# Patient Record
Sex: MALE | Race: OTHER | HISPANIC OR LATINO | URBAN - METROPOLITAN AREA
[De-identification: names, ages, dates, MRNs, and addresses within clinical notes are randomized per-mention and may not be internally consistent; named-entity substitution may affect disease eponyms.]

---

## 2020-03-29 ENCOUNTER — EMERGENCY (EMERGENCY)
Facility: HOSPITAL | Age: 40
LOS: 1 days | Discharge: HOME | End: 2020-03-29
Attending: EMERGENCY MEDICINE
Payer: MEDICARE

## 2020-03-29 VITALS
TEMPERATURE: 102 F | WEIGHT: 169.98 LBS | DIASTOLIC BLOOD PRESSURE: 88 MMHG | OXYGEN SATURATION: 96 % | HEART RATE: 123 BPM | RESPIRATION RATE: 24 BRPM | SYSTOLIC BLOOD PRESSURE: 143 MMHG | HEIGHT: 62 IN

## 2020-03-29 PROCEDURE — 99284 EMERGENCY DEPT VISIT MOD MDM: CPT

## 2020-03-29 PROCEDURE — 99282 EMERGENCY DEPT VISIT SF MDM: CPT

## 2020-03-29 RX ORDER — ACETAMINOPHEN 500 MG
975 TABLET ORAL ONCE
Refills: 0 | Status: COMPLETED | OUTPATIENT
Start: 2020-03-29 | End: 2020-03-29

## 2020-03-29 RX ADMIN — Medication 975 MILLIGRAM(S): at 20:09

## 2020-03-29 NOTE — ED ADULT TRIAGE NOTE - CHIEF COMPLAINT QUOTE
patient states that his partner was here on Wednesday and the department of health told him this morning that his partner was positive. patient states that he has been having these symptoms since Friday - states that he feels like he is running when he is trying to breath - has a really bad cough and fever and is hot to touch - body aches and pain

## 2020-03-29 NOTE — ED PROVIDER NOTE - PATIENT PORTAL LINK FT
You can access the FollowMyHealth Patient Portal offered by [facility] by registering at the following website: [email or web address]. By joining [employer]’s FollowMyHealth portal, you will also be able to view your health information using other applications (apps) compatible with our system.

## 2020-03-29 NOTE — ED PROVIDER NOTE - HEME LYMPH, MLM
Patient attended Phase III Cardiac Wellness  Paid for the month   No adenopathy or splenomegaly. No cervical or inguinal lymphadenopathy.

## 2020-03-29 NOTE — ED PROVIDER NOTE - OBJECTIVE STATEMENT
Patient is a 40 y/o male with no pmhx presenting to the ed with fever, body aches, cough associated sob since friday. pt states partner tested positive for covid 19 received the phone call this morning. pt non smoker, no resp or cardiac issues. pt denies cp, palpitations, calf pain or leg swelling, recent surgeries or travel, hx of blood clots, abd pain, nausesa, vomiting, diarrhea, dysuria

## 2020-03-29 NOTE — ED PROVIDER NOTE - CLINICAL SUMMARY MEDICAL DECISION MAKING FREE TEXT BOX
Supportive care and hydration, self isolate for 14 days, antipyretics as needed for fever, azithromycin to pharamacy, strict return precautions to the ed

## 2020-03-29 NOTE — ED PROVIDER NOTE - ATTENDING CONTRIBUTION TO CARE
38yo male no pMH p/w flu-like symptoms- cough, fever, body aches. Pt's partner COVID+. Denies shortness of breath/CP. VSS, PE unremarkable. Suspect COVID. Stable for dc home with instructions to quarantine.    I performed a history and physical exam of the patient and discussed their management with the advanced care provider. I reviewed the advanced care provider's note and agree with the documented findings and plan of care. My medical decision making and objective findings are found above.

## 2020-04-04 ENCOUNTER — INPATIENT (INPATIENT)
Facility: HOSPITAL | Age: 40
LOS: 2 days | Discharge: HOME | DRG: 177 | End: 2020-04-07
Attending: INTERNAL MEDICINE | Admitting: HOSPITALIST
Payer: MEDICARE

## 2020-04-04 VITALS
HEIGHT: 62 IN | OXYGEN SATURATION: 94 % | RESPIRATION RATE: 50 BRPM | SYSTOLIC BLOOD PRESSURE: 105 MMHG | WEIGHT: 169.98 LBS | HEART RATE: 115 BPM | TEMPERATURE: 100 F | DIASTOLIC BLOOD PRESSURE: 66 MMHG

## 2020-04-04 DIAGNOSIS — R09.02 HYPOXEMIA: ICD-10-CM

## 2020-04-04 LAB
ALBUMIN SERPL ELPH-MCNC: 4.1 G/DL — SIGNIFICANT CHANGE UP (ref 3.3–5.2)
ALP SERPL-CCNC: 96 U/L — SIGNIFICANT CHANGE UP (ref 40–120)
ALT FLD-CCNC: 73 U/L — HIGH
ANION GAP SERPL CALC-SCNC: 12 MMOL/L — SIGNIFICANT CHANGE UP (ref 5–17)
AST SERPL-CCNC: 44 U/L — HIGH
BASOPHILS # BLD AUTO: 0.02 K/UL — SIGNIFICANT CHANGE UP (ref 0–0.2)
BASOPHILS NFR BLD AUTO: 0.3 % — SIGNIFICANT CHANGE UP (ref 0–2)
BILIRUB SERPL-MCNC: 0.4 MG/DL — SIGNIFICANT CHANGE UP (ref 0.4–2)
BUN SERPL-MCNC: 8 MG/DL — SIGNIFICANT CHANGE UP (ref 8–20)
CALCIUM SERPL-MCNC: 8.7 MG/DL — SIGNIFICANT CHANGE UP (ref 8.6–10.2)
CHLORIDE SERPL-SCNC: 94 MMOL/L — LOW (ref 98–107)
CO2 SERPL-SCNC: 28 MMOL/L — SIGNIFICANT CHANGE UP (ref 22–29)
CREAT SERPL-MCNC: 0.92 MG/DL — SIGNIFICANT CHANGE UP (ref 0.5–1.3)
CRP SERPL-MCNC: 3.95 MG/DL — HIGH (ref 0–0.4)
EOSINOPHIL # BLD AUTO: 0.03 K/UL — SIGNIFICANT CHANGE UP (ref 0–0.5)
EOSINOPHIL NFR BLD AUTO: 0.4 % — SIGNIFICANT CHANGE UP (ref 0–6)
FERRITIN SERPL-MCNC: 1075 NG/ML — HIGH (ref 30–400)
GLUCOSE SERPL-MCNC: 102 MG/DL — HIGH (ref 70–99)
HCT VFR BLD CALC: 46.9 % — SIGNIFICANT CHANGE UP (ref 39–50)
HGB BLD-MCNC: 15.5 G/DL — SIGNIFICANT CHANGE UP (ref 13–17)
IMM GRANULOCYTES NFR BLD AUTO: 0.5 % — SIGNIFICANT CHANGE UP (ref 0–1.5)
LACTATE BLDV-MCNC: 0.8 MMOL/L — SIGNIFICANT CHANGE UP (ref 0.5–2)
LDH SERPL L TO P-CCNC: 281 U/L — HIGH (ref 98–192)
LYMPHOCYTES # BLD AUTO: 1.02 K/UL — SIGNIFICANT CHANGE UP (ref 1–3.3)
LYMPHOCYTES # BLD AUTO: 13.1 % — SIGNIFICANT CHANGE UP (ref 13–44)
MCHC RBC-ENTMCNC: 29.8 PG — SIGNIFICANT CHANGE UP (ref 27–34)
MCHC RBC-ENTMCNC: 33 GM/DL — SIGNIFICANT CHANGE UP (ref 32–36)
MCV RBC AUTO: 90 FL — SIGNIFICANT CHANGE UP (ref 80–100)
MONOCYTES # BLD AUTO: 0.65 K/UL — SIGNIFICANT CHANGE UP (ref 0–0.9)
MONOCYTES NFR BLD AUTO: 8.4 % — SIGNIFICANT CHANGE UP (ref 2–14)
NEUTROPHILS # BLD AUTO: 6 K/UL — SIGNIFICANT CHANGE UP (ref 1.8–7.4)
NEUTROPHILS NFR BLD AUTO: 77.3 % — HIGH (ref 43–77)
PLATELET # BLD AUTO: 242 K/UL — SIGNIFICANT CHANGE UP (ref 150–400)
POTASSIUM SERPL-MCNC: 4.5 MMOL/L — SIGNIFICANT CHANGE UP (ref 3.5–5.3)
POTASSIUM SERPL-SCNC: 4.5 MMOL/L — SIGNIFICANT CHANGE UP (ref 3.5–5.3)
PROCALCITONIN SERPL-MCNC: 0.08 NG/ML — SIGNIFICANT CHANGE UP (ref 0.02–0.1)
PROT SERPL-MCNC: 7.6 G/DL — SIGNIFICANT CHANGE UP (ref 6.6–8.7)
RBC # BLD: 5.21 M/UL — SIGNIFICANT CHANGE UP (ref 4.2–5.8)
RBC # FLD: 12.5 % — SIGNIFICANT CHANGE UP (ref 10.3–14.5)
SODIUM SERPL-SCNC: 134 MMOL/L — LOW (ref 135–145)
WBC # BLD: 7.76 K/UL — SIGNIFICANT CHANGE UP (ref 3.8–10.5)
WBC # FLD AUTO: 7.76 K/UL — SIGNIFICANT CHANGE UP (ref 3.8–10.5)

## 2020-04-04 PROCEDURE — 93010 ELECTROCARDIOGRAM REPORT: CPT

## 2020-04-04 PROCEDURE — 99223 1ST HOSP IP/OBS HIGH 75: CPT

## 2020-04-04 PROCEDURE — 71045 X-RAY EXAM CHEST 1 VIEW: CPT | Mod: 26

## 2020-04-04 PROCEDURE — 99285 EMERGENCY DEPT VISIT HI MDM: CPT

## 2020-04-04 RX ORDER — SODIUM CHLORIDE 9 MG/ML
1000 INJECTION INTRAMUSCULAR; INTRAVENOUS; SUBCUTANEOUS ONCE
Refills: 0 | Status: COMPLETED | OUTPATIENT
Start: 2020-04-04 | End: 2020-04-04

## 2020-04-04 RX ORDER — HEPARIN SODIUM 5000 [USP'U]/ML
5000 INJECTION INTRAVENOUS; SUBCUTANEOUS EVERY 8 HOURS
Refills: 0 | Status: DISCONTINUED | OUTPATIENT
Start: 2020-04-04 | End: 2020-04-06

## 2020-04-04 RX ORDER — ACETAMINOPHEN 500 MG
650 TABLET ORAL ONCE
Refills: 0 | Status: COMPLETED | OUTPATIENT
Start: 2020-04-04 | End: 2020-04-04

## 2020-04-04 RX ORDER — AZITHROMYCIN 500 MG/1
500 TABLET, FILM COATED ORAL ONCE
Refills: 0 | Status: COMPLETED | OUTPATIENT
Start: 2020-04-04 | End: 2020-04-04

## 2020-04-04 RX ORDER — ACETAMINOPHEN 500 MG
650 TABLET ORAL EVERY 4 HOURS
Refills: 0 | Status: DISCONTINUED | OUTPATIENT
Start: 2020-04-04 | End: 2020-04-07

## 2020-04-04 RX ORDER — ASCORBIC ACID 60 MG
1000 TABLET,CHEWABLE ORAL DAILY
Refills: 0 | Status: DISCONTINUED | OUTPATIENT
Start: 2020-04-04 | End: 2020-04-07

## 2020-04-04 RX ORDER — CEFTRIAXONE 500 MG/1
1000 INJECTION, POWDER, FOR SOLUTION INTRAMUSCULAR; INTRAVENOUS ONCE
Refills: 0 | Status: COMPLETED | OUTPATIENT
Start: 2020-04-04 | End: 2020-04-04

## 2020-04-04 RX ADMIN — HEPARIN SODIUM 5000 UNIT(S): 5000 INJECTION INTRAVENOUS; SUBCUTANEOUS at 23:55

## 2020-04-04 RX ADMIN — Medication 650 MILLIGRAM(S): at 16:58

## 2020-04-04 RX ADMIN — SODIUM CHLORIDE 1000 MILLILITER(S): 9 INJECTION INTRAMUSCULAR; INTRAVENOUS; SUBCUTANEOUS at 17:54

## 2020-04-04 RX ADMIN — CEFTRIAXONE 1000 MILLIGRAM(S): 500 INJECTION, POWDER, FOR SOLUTION INTRAMUSCULAR; INTRAVENOUS at 17:54

## 2020-04-04 RX ADMIN — SODIUM CHLORIDE 1000 MILLILITER(S): 9 INJECTION INTRAMUSCULAR; INTRAVENOUS; SUBCUTANEOUS at 16:58

## 2020-04-04 RX ADMIN — AZITHROMYCIN 500 MILLIGRAM(S): 500 TABLET, FILM COATED ORAL at 19:39

## 2020-04-04 RX ADMIN — AZITHROMYCIN 255 MILLIGRAM(S): 500 TABLET, FILM COATED ORAL at 17:54

## 2020-04-04 RX ADMIN — CEFTRIAXONE 100 MILLIGRAM(S): 500 INJECTION, POWDER, FOR SOLUTION INTRAMUSCULAR; INTRAVENOUS at 16:58

## 2020-04-04 NOTE — H&P ADULT - HISTORY OF PRESENT ILLNESS
40 y/o male with no significant PMH came to the ED complaining of difficulty breathing with minimal exertion. Patient said he has been having persistent fever, body aches, for over a week. He came to the ED was sent home to self isolate. He called his PCP because he was not getting better who recommended for him to go to [place] for CoVID-19 test. He was tested; got a call yesterday that he tested positive. He mentioned associated cough that worsened with inspiration. He was taken care of his friend 2 weeks ago who was also tested positive for coronavirus. He has no recent travel, nausea, vomiting, abdominal pain, chest pain, palpitation, HA.

## 2020-04-04 NOTE — ED ADULT NURSE NOTE - OBJECTIVE STATEMENT
Pt told he was +covid today after being tested Thursday. Pt having cough, fever, sob. Pt told by [facility] to come to ED for eval. Pt denies any PMHx.

## 2020-04-04 NOTE — H&P ADULT - ASSESSMENT
38 y/o male with no significant PMH came to the ED complaining of difficulty breathing with minimal exertion. Patient said he has been having persistent fever, body aches, for over a week. He came to the ED was sent home to self isolate. He called his PCP because he was not getting better who recommended for him to go to [place] for CoVID-19 test. He was tested; got a call yesterday that he tested positive. He mentioned associated cough that worsened with inspiration. He was taken care of his friend 2 weeks ago who was also tested positive for coronavirus.  CXR: LEFT perihilar/midlung and peripheral bibasilar consolidation. The appearance raises the possibility of atypical viral/COVID pneumonia.     Hypoxia due to atypical CoVID-19 pneumonia   Admit to medical floor with    Inflammatory makers elevated   CoVID-19 positive (test was done in outside facility)   Tylenol PRN   Patient stable, will hold off on antibiotic for now   Vitamin C daily   Isolation protocol   Prone positioning every hour for at least 15 minutes as tolerated     Supportive   DVT prophylaxis: heparin sc   Diet: regular

## 2020-04-04 NOTE — ED PROVIDER NOTE - PHYSICAL EXAMINATION
General: well appearing, interactive, well nourished, NAD  HEENT: pupils equal and reactive, normal external ears bilaterally   Cardiac: RRR, no MRG appreciated  Resp: tachypneic, diminished breath sounds, symmetric chest wall rise  Abd: soft, nontender, nondistended,   : no CVA tenderness  Neuro: Moving all extremities  Skin:  normal color for race

## 2020-04-04 NOTE — ED PROVIDER NOTE - ATTENDING CONTRIBUTION TO CARE
The patient seen and examined    Pneumonia  COVID-19    I, James Steven, performed the initial face to face bedside interview with this patient regarding history of present illness, review of symptoms and relevant past medical, social and family history.  I completed an independent physical examination.  I was the initial provider who evaluated this patient. I have signed out the follow up of any pending tests (i.e. labs, radiological studies) to the resident.  I have communicated the patient’s plan of care and disposition with the resident.

## 2020-04-04 NOTE — ED PROVIDER NOTE - OBJECTIVE STATEMENT
Pt is a 40 y/o M presents c/o shortness of breath.  States he was seen at [facility] for flu like symptoms and sent home to self isolate.  He began to feel shortness of breath on wednesday and called his PMD and was told to go get tested, he received the results today and was experiencing worsening shortness of breath, told over the phone to go to the ED.  Pt denies fever, headache, diarrhea.

## 2020-04-04 NOTE — ED PROVIDER NOTE - CARE PLAN
Principal Discharge DX:	Pneumonia  Secondary Diagnosis:	Hypoxemia  Secondary Diagnosis:	Coronavirus infection

## 2020-04-04 NOTE — ED ADULT TRIAGE NOTE - CHIEF COMPLAINT QUOTE
pt reports he was tested thursday for COVID and called back today for +results, while on phone he was told by [place] to go to nearest hospital for further eval for his trouble breathing. pt presents with dry cough, SOB, tachypnea.

## 2020-04-04 NOTE — ED ADULT NURSE NOTE - CHIEF COMPLAINT QUOTE
pt reports he was tested thursday for COVID and called back today for +results, while on phone he was told by [place] to go to nearest hospital for further eval for his trouble breathing. pt presents with dry cough, SOB, tachypnea.

## 2020-04-05 LAB
ALBUMIN SERPL ELPH-MCNC: 3.7 G/DL — SIGNIFICANT CHANGE UP (ref 3.3–5.2)
ALP SERPL-CCNC: 98 U/L — SIGNIFICANT CHANGE UP (ref 40–120)
ALT FLD-CCNC: 86 U/L — HIGH
ANION GAP SERPL CALC-SCNC: 14 MMOL/L — SIGNIFICANT CHANGE UP (ref 5–17)
AST SERPL-CCNC: 55 U/L — HIGH
BASOPHILS # BLD AUTO: 0.02 K/UL — SIGNIFICANT CHANGE UP (ref 0–0.2)
BASOPHILS NFR BLD AUTO: 0.3 % — SIGNIFICANT CHANGE UP (ref 0–2)
BILIRUB SERPL-MCNC: 0.4 MG/DL — SIGNIFICANT CHANGE UP (ref 0.4–2)
BUN SERPL-MCNC: 8 MG/DL — SIGNIFICANT CHANGE UP (ref 8–20)
CALCIUM SERPL-MCNC: 8.8 MG/DL — SIGNIFICANT CHANGE UP (ref 8.6–10.2)
CHLORIDE SERPL-SCNC: 98 MMOL/L — SIGNIFICANT CHANGE UP (ref 98–107)
CO2 SERPL-SCNC: 25 MMOL/L — SIGNIFICANT CHANGE UP (ref 22–29)
CREAT SERPL-MCNC: 0.67 MG/DL — SIGNIFICANT CHANGE UP (ref 0.5–1.3)
EOSINOPHIL # BLD AUTO: 0.03 K/UL — SIGNIFICANT CHANGE UP (ref 0–0.5)
EOSINOPHIL NFR BLD AUTO: 0.4 % — SIGNIFICANT CHANGE UP (ref 0–6)
GLUCOSE SERPL-MCNC: 92 MG/DL — SIGNIFICANT CHANGE UP (ref 70–99)
HCT VFR BLD CALC: 47.5 % — SIGNIFICANT CHANGE UP (ref 39–50)
HGB BLD-MCNC: 15.4 G/DL — SIGNIFICANT CHANGE UP (ref 13–17)
IMM GRANULOCYTES NFR BLD AUTO: 0.6 % — SIGNIFICANT CHANGE UP (ref 0–1.5)
LYMPHOCYTES # BLD AUTO: 1.17 K/UL — SIGNIFICANT CHANGE UP (ref 1–3.3)
LYMPHOCYTES # BLD AUTO: 14.9 % — SIGNIFICANT CHANGE UP (ref 13–44)
MCHC RBC-ENTMCNC: 30.1 PG — SIGNIFICANT CHANGE UP (ref 27–34)
MCHC RBC-ENTMCNC: 32.4 GM/DL — SIGNIFICANT CHANGE UP (ref 32–36)
MCV RBC AUTO: 92.8 FL — SIGNIFICANT CHANGE UP (ref 80–100)
MONOCYTES # BLD AUTO: 0.67 K/UL — SIGNIFICANT CHANGE UP (ref 0–0.9)
MONOCYTES NFR BLD AUTO: 8.5 % — SIGNIFICANT CHANGE UP (ref 2–14)
NEUTROPHILS # BLD AUTO: 5.93 K/UL — SIGNIFICANT CHANGE UP (ref 1.8–7.4)
NEUTROPHILS NFR BLD AUTO: 75.3 % — SIGNIFICANT CHANGE UP (ref 43–77)
PLATELET # BLD AUTO: 242 K/UL — SIGNIFICANT CHANGE UP (ref 150–400)
POTASSIUM SERPL-MCNC: 4.3 MMOL/L — SIGNIFICANT CHANGE UP (ref 3.5–5.3)
POTASSIUM SERPL-SCNC: 4.3 MMOL/L — SIGNIFICANT CHANGE UP (ref 3.5–5.3)
PROT SERPL-MCNC: 7.3 G/DL — SIGNIFICANT CHANGE UP (ref 6.6–8.7)
RBC # BLD: 5.12 M/UL — SIGNIFICANT CHANGE UP (ref 4.2–5.8)
RBC # FLD: 12.7 % — SIGNIFICANT CHANGE UP (ref 10.3–14.5)
SODIUM SERPL-SCNC: 137 MMOL/L — SIGNIFICANT CHANGE UP (ref 135–145)
WBC # BLD: 7.87 K/UL — SIGNIFICANT CHANGE UP (ref 3.8–10.5)
WBC # FLD AUTO: 7.87 K/UL — SIGNIFICANT CHANGE UP (ref 3.8–10.5)

## 2020-04-05 PROCEDURE — 99232 SBSQ HOSP IP/OBS MODERATE 35: CPT

## 2020-04-05 PROCEDURE — 93010 ELECTROCARDIOGRAM REPORT: CPT

## 2020-04-05 RX ORDER — ALBUTEROL 90 UG/1
2 AEROSOL, METERED ORAL EVERY 6 HOURS
Refills: 0 | Status: DISCONTINUED | OUTPATIENT
Start: 2020-04-05 | End: 2020-04-07

## 2020-04-05 RX ORDER — HYDROXYCHLOROQUINE SULFATE 200 MG
200 TABLET ORAL EVERY 12 HOURS
Refills: 0 | Status: DISCONTINUED | OUTPATIENT
Start: 2020-04-06 | End: 2020-04-07

## 2020-04-05 RX ORDER — HYDROXYCHLOROQUINE SULFATE 200 MG
TABLET ORAL
Refills: 0 | Status: DISCONTINUED | OUTPATIENT
Start: 2020-04-05 | End: 2020-04-07

## 2020-04-05 RX ORDER — AMITRIPTYLINE HCL 25 MG
50 TABLET ORAL DAILY
Refills: 0 | Status: DISCONTINUED | OUTPATIENT
Start: 2020-04-05 | End: 2020-04-07

## 2020-04-05 RX ORDER — ALPRAZOLAM 0.25 MG
0.5 TABLET ORAL
Refills: 0 | Status: DISCONTINUED | OUTPATIENT
Start: 2020-04-05 | End: 2020-04-07

## 2020-04-05 RX ORDER — HYDROXYCHLOROQUINE SULFATE 200 MG
400 TABLET ORAL EVERY 12 HOURS
Refills: 0 | Status: COMPLETED | OUTPATIENT
Start: 2020-04-05 | End: 2020-04-05

## 2020-04-05 RX ADMIN — Medication 1000 MILLIGRAM(S): at 11:57

## 2020-04-05 RX ADMIN — Medication 0.5 MILLIGRAM(S): at 18:38

## 2020-04-05 RX ADMIN — Medication 400 MILLIGRAM(S): at 11:57

## 2020-04-05 RX ADMIN — HEPARIN SODIUM 5000 UNIT(S): 5000 INJECTION INTRAVENOUS; SUBCUTANEOUS at 04:32

## 2020-04-05 RX ADMIN — Medication 50 MILLIGRAM(S): at 18:38

## 2020-04-05 RX ADMIN — HEPARIN SODIUM 5000 UNIT(S): 5000 INJECTION INTRAVENOUS; SUBCUTANEOUS at 15:00

## 2020-04-05 RX ADMIN — Medication 400 MILLIGRAM(S): at 22:53

## 2020-04-05 RX ADMIN — HEPARIN SODIUM 5000 UNIT(S): 5000 INJECTION INTRAVENOUS; SUBCUTANEOUS at 22:54

## 2020-04-05 NOTE — PROGRESS NOTE ADULT - ASSESSMENT
38 y/o male with no significant PMH came to the ED complaining of difficulty breathing with minimal exertion. Patient said he has been having persistent fever, body aches, for over a week. He came to the ED was sent home to self isolate. He called his PCP because he was not getting better who recommended for him to go to [place] for CoVID-19 test. He was tested; got a call yesterday that he tested positive. He mentioned associated cough that worsened with inspiration. He was taken care of his friend 2 weeks ago who was also tested positive for coronavirus.  CXR: LEFT perihilar/midlung and peripheral bibasilar consolidation. The appearance raises the possibility of atypical viral/COVID pneumonia.     > acute Hypoxic resp failure / likely due to atypical viral CoVID-19 pneumonia /CoVID-19 positive (test was done in outside facility)   Inflammatory makers elevated   -will start on HCQ   -Vitamin C daily and thiamine   -Isolation protocol   -Prone positioning every hour for at least 15 minutes as tolerated     >DVT prophylaxis: heparin sc     >dispo: possible dc home in 24-48 hours if medically stable and able to sat >90 on RA

## 2020-04-05 NOTE — ED ADULT NURSE REASSESSMENT NOTE - NS ED NURSE REASSESS COMMENT FT1
Pt reieved @ 0800. pt a&ox4 with complaints of sob. pt satting well on 3L nasal canula, ambulated to the bathroom, respirations equal and unlabored, isolation precautions maintained, montior/  on, poc reviewed with pt.

## 2020-04-05 NOTE — PROGRESS NOTE ADULT - SUBJECTIVE AND OBJECTIVE BOX
cc: sob , covid pna       interval hx: patient seen and eval, had fever 101.5 overnight. ON NC o2 4 L , sats 91-96 , mildly tachypneic .no n/v/diarrhea    Vital Signs Last 24 Hrs  T(C): 37.6 (05 Apr 2020 08:16), Max: 38.6 (04 Apr 2020 18:39)  T(F): 99.7 (05 Apr 2020 08:16), Max: 101.5 (04 Apr 2020 18:39)  HR: 102 (05 Apr 2020 08:16) (95 - 115)  BP: 115/76 (05 Apr 2020 08:16) (100/55 - 115/76)  BP(mean): --  RR: 21 (05 Apr 2020 08:16) (21 - 50)  SpO2: 96% (05 Apr 2020 08:16) (91% - 96%)    Physical Examination:   General:  mild sob, able to speak in full sentences   Head:     NC/AT, EOMI, oral mucosa moist  Neck:     supple, trachea midline  Lungs:     occasional rhonchi at bases b/l   CVS:     S1S2, RRR, no m/g/r  Abd:     +BS, s/nt/nd, no organomegaly  Ext:   no dedema noted   neuro: nonfocal                            15.5   7.76  )-----------( 242      ( 04 Apr 2020 17:13 )             46.9   04-04    134<L>  |  94<L>  |  8.0  ----------------------------<  102<H>  4.5   |  28.0  |  0.92    Ca    8.7      04 Apr 2020 17:13    TPro  7.6  /  Alb  4.1  /  TBili  0.4  /  DBili  x   /  AST  44<H>  /  ALT  73<H>  /  AlkPhos  96  04-04    < from: Xray Chest 1 View-PORTABLE IMMEDIATE (04.04.20 @ 15:54) >  IMPRESSION:    1.  LEFT perihilar/midlung and peripheral bibasilar consolidation. The appearance raises the possibility of atypical viral/COVID pneumonia    < end of copied text >

## 2020-04-06 PROCEDURE — 99232 SBSQ HOSP IP/OBS MODERATE 35: CPT

## 2020-04-06 PROCEDURE — 93010 ELECTROCARDIOGRAM REPORT: CPT

## 2020-04-06 RX ORDER — CHOLECALCIFEROL (VITAMIN D3) 125 MCG
1000 CAPSULE ORAL DAILY
Refills: 0 | Status: DISCONTINUED | OUTPATIENT
Start: 2020-04-06 | End: 2020-04-07

## 2020-04-06 RX ORDER — ENOXAPARIN SODIUM 100 MG/ML
40 INJECTION SUBCUTANEOUS DAILY
Refills: 0 | Status: DISCONTINUED | OUTPATIENT
Start: 2020-04-06 | End: 2020-04-07

## 2020-04-06 RX ORDER — THIAMINE MONONITRATE (VIT B1) 100 MG
200 TABLET ORAL DAILY
Refills: 0 | Status: DISCONTINUED | OUTPATIENT
Start: 2020-04-06 | End: 2020-04-07

## 2020-04-06 RX ADMIN — HEPARIN SODIUM 5000 UNIT(S): 5000 INJECTION INTRAVENOUS; SUBCUTANEOUS at 13:55

## 2020-04-06 RX ADMIN — Medication 100 MILLIGRAM(S): at 22:26

## 2020-04-06 RX ADMIN — Medication 200 MILLIGRAM(S): at 22:27

## 2020-04-06 RX ADMIN — Medication 200 MILLIGRAM(S): at 11:25

## 2020-04-06 RX ADMIN — Medication 1000 MILLIGRAM(S): at 11:25

## 2020-04-06 RX ADMIN — Medication 0.5 MILLIGRAM(S): at 22:26

## 2020-04-06 RX ADMIN — Medication 50 MILLIGRAM(S): at 22:26

## 2020-04-06 RX ADMIN — HEPARIN SODIUM 5000 UNIT(S): 5000 INJECTION INTRAVENOUS; SUBCUTANEOUS at 05:30

## 2020-04-06 NOTE — PROGRESS NOTE ADULT - ASSESSMENT
38 y/o male with no significant PMH came to the ED complaining of difficulty breathing with minimal exertion, known covid19 pos from community testing, admitted for resp failure.    > acute Hypoxic resp failure sec to CoVID-19 pneumonia,. stable - pos pcr confirmed at another facility prior to admit.  -HCQ, monitor qtc, ekg 4/8 in am   -vitamin supplementation, supportive care  -Prone positioning as long as tolerated  -suplemental o2, titrate to RA as tolerated, not baseline dependent    >DVT prophylaxis: lovenox    >dispo: pending improvement/resolution of resp failure. if maintaining well on 2-3LNC and inflammatory markers improving will try to dc in am 40 y/o male with no significant PMH came to the ED complaining of difficulty breathing with minimal exertion, known covid19 pos from community testing, admitted for resp failure.    > acute Hypoxic resp failure sec to CoVID-19 pneumonia,. stable - pos pcr confirmed at another facility prior to admit.  -HCQ, monitor qtc, ekg 4/8 in am   -vitamin supplementation, supportive care  -Prone positioning as long as tolerated  -suplemental o2, titrate to RA as tolerated, not baseline dependent - desat to 87% on RA during ambulation, improves to 97% on 2LNC during ambulation. given the presence of consistent acute respiratory failure w/ hypoxia secondary to confirmed COVID19 infection which is otherwise under control and stable, the patient will need supplemental o2 therapy via NC in order to leave the hospital. due to current circumstances surrounding this pandemic, immediate discharge of this patient in order to increase hospital capacity and mitigate risk of additional spread is paramount, will request home o2 in order to expedite patient discharge to continue quarantine at home or another facility.     >DVT prophylaxis: lovenox    >dispo: pending improvement/resolution of resp failure. if maintaining well on 2-3LNC and inflammatory markers improving will try to dc in am - needs home o2

## 2020-04-06 NOTE — PROGRESS NOTE ADULT - SUBJECTIVE AND OBJECTIVE BOX
CC: covid    Patient seen and examined at the bedside. No acute overnight events. - yesterday. Complaining of - today. Denies fever/chills, headache, lightheadedness, dizziness, chest pain, palpitations, shortness of breath, cough, abd pain, nausea/vomiting/diarrhea, muscle pain.      =========================================================================================  T(C): 37.1 (04-06-20 @ 13:53), Max: 38.2 (04-05-20 @ 22:28)  HR: 102 (04-06-20 @ 13:53) (88 - 102)  BP: 104/73 (04-06-20 @ 13:53) (104/73 - 128/78)  RR: 22 (04-06-20 @ 13:53) (22 - 25)  SpO2: 97% (04-06-20 @ 13:53) (97% - 99%)    PHYSICAL EXAM.    GEN - appears age appropriate. well nourished. pleasant. no distress.   HEENT - NCAT, EOMI, ТАТЬЯНА  RESP - CTA BL, no wheeze/stridor/rhonchi/crackles. not on supplemental O2. able to speak in full sentences without distress.   CARDIO - NS1S2, RRR.   ABD - Soft/Non tender/Non distended. Normal BS x4 quadrants. no guarding/rebound tenderness.  Ext - No ALLY.  MSK - BL 5/5 strength on upper and lower extremities.   Neuro - AAOx3. no gross neuro deficits noted  Psych - normal affect  Skin - c/d/i. no rashes/lesions      I&O's Summary    Daily     Daily     =========================================================================================  LABS.    04-05    137  |  98  |  8.0  ----------------------------<  92  4.3   |  25.0  |  0.67    Ca    8.8      05 Apr 2020 13:52    TPro  7.3  /  Alb  3.7  /  TBili  0.4  /  DBili  x   /  AST  55<H>  /  ALT  86<H>  /  AlkPhos  98  04-05                          15.4   7.87  )-----------( 242      ( 05 Apr 2020 13:52 )             47.5     LIVER FUNCTIONS - ( 05 Apr 2020 13:52 )  Alb: 3.7 g/dL / Pro: 7.3 g/dL / ALK PHOS: 98 U/L / ALT: 86 U/L / AST: 55 U/L / GGT: x                       =========================================================================================  IMAGING.     =========================================================================================    DIET.    Diet, Regular (04-04-20 @ 21:37)      =========================================================================================    HOME MEDS.    Home Medications:      =========================================================================================    HOSPITAL MEDS.    MEDICATIONS  (STANDING):  amitriptyline 50 milliGRAM(s) Oral daily  ascorbic acid 1000 milliGRAM(s) Oral daily  heparin  Injectable 5000 Unit(s) SubCutaneous every 8 hours  hydroxychloroquine   Oral   hydroxychloroquine 200 milliGRAM(s) Oral every 12 hours    MEDICATIONS  (PRN):  acetaminophen   Tablet .. 650 milliGRAM(s) Oral every 4 hours PRN Temp greater or equal to 38.5C (101.3F)  ALBUTerol    90 MICROgram(s) HFA Inhaler 2 Puff(s) Inhalation every 6 hours PRN Shortness of Breath and/or Wheezing  ALPRAZolam 0.5 milliGRAM(s) Oral two times a day PRN anxiwty CC: covid    Patient seen and examined at the bedside. No acute overnight events. no events yesterday. Complaining of sob, occ cough today. Denies fever/chills, headache, lightheadedness, dizziness, chest pain, palpitations, abd pain, nausea/vomiting/diarrhea, muscle pain.      =========================================================================================  T(C): 37.1 (04-06-20 @ 13:53), Max: 38.2 (04-05-20 @ 22:28)  HR: 102 (04-06-20 @ 13:53) (88 - 102)  BP: 104/73 (04-06-20 @ 13:53) (104/73 - 128/78)  RR: 22 (04-06-20 @ 13:53) (22 - 25)  SpO2: 97% (04-06-20 @ 13:53) (97% - 99%)    PHYSICAL EXAM.    GEN - appears age appropriate. well nourished. pleasant. no distress.   HEENT - NCAT, EOMI, ТАТЬЯНА  RESP - CTA BL, no wheeze/stridor/rhonchi/crackles. occ coughing fit w/ deep inspiration. not on supplemental O2. able to speak in full sentences without distress. no accessory muscle use.   CARDIO - NS1S2, RRR.   ABD - Soft/Non tender/Non distended. Normal BS x4 quadrants. no guarding/rebound tenderness.  Ext - No ALLY.  MSK - BL 5/5 strength on upper and lower extremities.   Neuro - AAOx3. no gross neuro deficits noted  Psych - normal affect  Skin - c/d/i. no rashes/lesions      I&O's Summary    Daily     Daily     =========================================================================================  LABS.    04-05    137  |  98  |  8.0  ----------------------------<  92  4.3   |  25.0  |  0.67    Ca    8.8      05 Apr 2020 13:52    TPro  7.3  /  Alb  3.7  /  TBili  0.4  /  DBili  x   /  AST  55<H>  /  ALT  86<H>  /  AlkPhos  98  04-05                          15.4   7.87  )-----------( 242      ( 05 Apr 2020 13:52 )             47.5     LIVER FUNCTIONS - ( 05 Apr 2020 13:52 )  Alb: 3.7 g/dL / Pro: 7.3 g/dL / ALK PHOS: 98 U/L / ALT: 86 U/L / AST: 55 U/L / GGT: x                       =========================================================================================  IMAGING.     =========================================================================================    DIET.    Diet, Regular (04-04-20 @ 21:37)      =========================================================================================    HOME MEDS.    Home Medications:      =========================================================================================    HOSPITAL MEDS.    MEDICATIONS  (STANDING):  amitriptyline 50 milliGRAM(s) Oral daily  ascorbic acid 1000 milliGRAM(s) Oral daily  heparin  Injectable 5000 Unit(s) SubCutaneous every 8 hours  hydroxychloroquine   Oral   hydroxychloroquine 200 milliGRAM(s) Oral every 12 hours    MEDICATIONS  (PRN):  acetaminophen   Tablet .. 650 milliGRAM(s) Oral every 4 hours PRN Temp greater or equal to 38.5C (101.3F)  ALBUTerol    90 MICROgram(s) HFA Inhaler 2 Puff(s) Inhalation every 6 hours PRN Shortness of Breath and/or Wheezing  ALPRAZolam 0.5 milliGRAM(s) Oral two times a day PRN anxiwty

## 2020-04-07 ENCOUNTER — TRANSCRIPTION ENCOUNTER (OUTPATIENT)
Age: 40
End: 2020-04-07

## 2020-04-07 VITALS
TEMPERATURE: 98 F | RESPIRATION RATE: 18 BRPM | OXYGEN SATURATION: 96 % | DIASTOLIC BLOOD PRESSURE: 67 MMHG | HEART RATE: 86 BPM | SYSTOLIC BLOOD PRESSURE: 102 MMHG

## 2020-04-07 LAB
ALBUMIN SERPL ELPH-MCNC: 3.5 G/DL — SIGNIFICANT CHANGE UP (ref 3.3–5.2)
ALP SERPL-CCNC: 92 U/L — SIGNIFICANT CHANGE UP (ref 40–120)
ALT FLD-CCNC: 60 U/L — HIGH
ANION GAP SERPL CALC-SCNC: 12 MMOL/L — SIGNIFICANT CHANGE UP (ref 5–17)
AST SERPL-CCNC: 30 U/L — SIGNIFICANT CHANGE UP
BASOPHILS # BLD AUTO: 0.02 K/UL — SIGNIFICANT CHANGE UP (ref 0–0.2)
BASOPHILS NFR BLD AUTO: 0.3 % — SIGNIFICANT CHANGE UP (ref 0–2)
BILIRUB SERPL-MCNC: 0.5 MG/DL — SIGNIFICANT CHANGE UP (ref 0.4–2)
BUN SERPL-MCNC: 11 MG/DL — SIGNIFICANT CHANGE UP (ref 8–20)
CALCIUM SERPL-MCNC: 8.5 MG/DL — LOW (ref 8.6–10.2)
CHLORIDE SERPL-SCNC: 96 MMOL/L — LOW (ref 98–107)
CO2 SERPL-SCNC: 27 MMOL/L — SIGNIFICANT CHANGE UP (ref 22–29)
CREAT SERPL-MCNC: 0.82 MG/DL — SIGNIFICANT CHANGE UP (ref 0.5–1.3)
CRP SERPL-MCNC: 5.19 MG/DL — HIGH (ref 0–0.4)
D DIMER BLD IA.RAPID-MCNC: 164 NG/ML DDU — SIGNIFICANT CHANGE UP
EOSINOPHIL # BLD AUTO: 0.23 K/UL — SIGNIFICANT CHANGE UP (ref 0–0.5)
EOSINOPHIL NFR BLD AUTO: 3.3 % — SIGNIFICANT CHANGE UP (ref 0–6)
FERRITIN SERPL-MCNC: 1116 NG/ML — HIGH (ref 30–400)
FIBRINOGEN PPP-MCNC: 954 MG/DL — CRITICAL HIGH (ref 300–520)
GLUCOSE SERPL-MCNC: 88 MG/DL — SIGNIFICANT CHANGE UP (ref 70–99)
HCT VFR BLD CALC: 44.9 % — SIGNIFICANT CHANGE UP (ref 39–50)
HGB BLD-MCNC: 14.6 G/DL — SIGNIFICANT CHANGE UP (ref 13–17)
IMM GRANULOCYTES NFR BLD AUTO: 0.9 % — SIGNIFICANT CHANGE UP (ref 0–1.5)
LDH SERPL L TO P-CCNC: 208 U/L — HIGH (ref 98–192)
LYMPHOCYTES # BLD AUTO: 1.2 K/UL — SIGNIFICANT CHANGE UP (ref 1–3.3)
LYMPHOCYTES # BLD AUTO: 17 % — SIGNIFICANT CHANGE UP (ref 13–44)
MCHC RBC-ENTMCNC: 29.5 PG — SIGNIFICANT CHANGE UP (ref 27–34)
MCHC RBC-ENTMCNC: 32.5 GM/DL — SIGNIFICANT CHANGE UP (ref 32–36)
MCV RBC AUTO: 90.7 FL — SIGNIFICANT CHANGE UP (ref 80–100)
MONOCYTES # BLD AUTO: 0.71 K/UL — SIGNIFICANT CHANGE UP (ref 0–0.9)
MONOCYTES NFR BLD AUTO: 10.1 % — SIGNIFICANT CHANGE UP (ref 2–14)
NEUTROPHILS # BLD AUTO: 4.83 K/UL — SIGNIFICANT CHANGE UP (ref 1.8–7.4)
NEUTROPHILS NFR BLD AUTO: 68.4 % — SIGNIFICANT CHANGE UP (ref 43–77)
PLATELET # BLD AUTO: 314 K/UL — SIGNIFICANT CHANGE UP (ref 150–400)
POTASSIUM SERPL-MCNC: 3.9 MMOL/L — SIGNIFICANT CHANGE UP (ref 3.5–5.3)
POTASSIUM SERPL-SCNC: 3.9 MMOL/L — SIGNIFICANT CHANGE UP (ref 3.5–5.3)
PROCALCITONIN SERPL-MCNC: 0.07 NG/ML — SIGNIFICANT CHANGE UP (ref 0.02–0.1)
PROT SERPL-MCNC: 7.1 G/DL — SIGNIFICANT CHANGE UP (ref 6.6–8.7)
RBC # BLD: 4.95 M/UL — SIGNIFICANT CHANGE UP (ref 4.2–5.8)
RBC # FLD: 12.7 % — SIGNIFICANT CHANGE UP (ref 10.3–14.5)
SODIUM SERPL-SCNC: 135 MMOL/L — SIGNIFICANT CHANGE UP (ref 135–145)
TRIGL SERPL-MCNC: 128 MG/DL — SIGNIFICANT CHANGE UP (ref 10–200)
TROPONIN T SERPL-MCNC: <0.01 NG/ML — SIGNIFICANT CHANGE UP (ref 0–0.06)
WBC # BLD: 7.05 K/UL — SIGNIFICANT CHANGE UP (ref 3.8–10.5)
WBC # FLD AUTO: 7.05 K/UL — SIGNIFICANT CHANGE UP (ref 3.8–10.5)

## 2020-04-07 PROCEDURE — 86140 C-REACTIVE PROTEIN: CPT

## 2020-04-07 PROCEDURE — 84484 ASSAY OF TROPONIN QUANT: CPT

## 2020-04-07 PROCEDURE — 93005 ELECTROCARDIOGRAM TRACING: CPT

## 2020-04-07 PROCEDURE — 99239 HOSP IP/OBS DSCHRG MGMT >30: CPT

## 2020-04-07 PROCEDURE — 85384 FIBRINOGEN ACTIVITY: CPT

## 2020-04-07 PROCEDURE — 85379 FIBRIN DEGRADATION QUANT: CPT

## 2020-04-07 PROCEDURE — 82728 ASSAY OF FERRITIN: CPT

## 2020-04-07 PROCEDURE — 96365 THER/PROPH/DIAG IV INF INIT: CPT

## 2020-04-07 PROCEDURE — 36415 COLL VENOUS BLD VENIPUNCTURE: CPT

## 2020-04-07 PROCEDURE — 99285 EMERGENCY DEPT VISIT HI MDM: CPT | Mod: 25

## 2020-04-07 PROCEDURE — 84145 PROCALCITONIN (PCT): CPT

## 2020-04-07 PROCEDURE — 71045 X-RAY EXAM CHEST 1 VIEW: CPT

## 2020-04-07 PROCEDURE — 84478 ASSAY OF TRIGLYCERIDES: CPT

## 2020-04-07 PROCEDURE — 96375 TX/PRO/DX INJ NEW DRUG ADDON: CPT

## 2020-04-07 PROCEDURE — 83615 LACTATE (LD) (LDH) ENZYME: CPT

## 2020-04-07 PROCEDURE — 83605 ASSAY OF LACTIC ACID: CPT

## 2020-04-07 PROCEDURE — 80053 COMPREHEN METABOLIC PANEL: CPT

## 2020-04-07 PROCEDURE — 85027 COMPLETE CBC AUTOMATED: CPT

## 2020-04-07 RX ORDER — ASCORBIC ACID 60 MG
1 TABLET,CHEWABLE ORAL
Qty: 0 | Refills: 0 | DISCHARGE
Start: 2020-04-07

## 2020-04-07 RX ORDER — CHOLECALCIFEROL (VITAMIN D3) 125 MCG
1000 CAPSULE ORAL
Qty: 0 | Refills: 0 | DISCHARGE
Start: 2020-04-07

## 2020-04-07 RX ORDER — AMITRIPTYLINE HCL 25 MG
1 TABLET ORAL
Qty: 30 | Refills: 0
Start: 2020-04-07 | End: 2020-05-06

## 2020-04-07 RX ORDER — HYDROXYCHLOROQUINE SULFATE 200 MG
1 TABLET ORAL
Qty: 5 | Refills: 0
Start: 2020-04-07 | End: 2020-04-09

## 2020-04-07 RX ORDER — THIAMINE MONONITRATE (VIT B1) 100 MG
2 TABLET ORAL
Qty: 0 | Refills: 0 | DISCHARGE
Start: 2020-04-07

## 2020-04-07 RX ORDER — ZINC SULFATE TAB 220 MG (50 MG ZINC EQUIVALENT) 220 (50 ZN) MG
1 TAB ORAL
Qty: 14 | Refills: 0
Start: 2020-04-07 | End: 2020-04-20

## 2020-04-07 RX ORDER — ACETAMINOPHEN 500 MG
2 TABLET ORAL
Qty: 0 | Refills: 0 | DISCHARGE
Start: 2020-04-07

## 2020-04-07 RX ORDER — ALPRAZOLAM 0.25 MG
1 TABLET ORAL
Qty: 0 | Refills: 0 | DISCHARGE
Start: 2020-04-07

## 2020-04-07 RX ORDER — ALBUTEROL 90 UG/1
2 AEROSOL, METERED ORAL
Qty: 1 | Refills: 0
Start: 2020-04-07 | End: 2020-05-06

## 2020-04-07 RX ADMIN — Medication 50 MILLIGRAM(S): at 13:00

## 2020-04-07 RX ADMIN — Medication 1000 MILLIGRAM(S): at 13:00

## 2020-04-07 RX ADMIN — ENOXAPARIN SODIUM 40 MILLIGRAM(S): 100 INJECTION SUBCUTANEOUS at 13:00

## 2020-04-07 RX ADMIN — Medication 200 MILLIGRAM(S): at 13:00

## 2020-04-07 RX ADMIN — Medication 100 MILLIGRAM(S): at 05:25

## 2020-04-07 RX ADMIN — Medication 1000 UNIT(S): at 13:01

## 2020-04-07 RX ADMIN — Medication 100 MILLIGRAM(S): at 13:01

## 2020-04-07 NOTE — DISCHARGE NOTE NURSING/CASE MANAGEMENT/SOCIAL WORK - PATIENT PORTAL LINK FT
You can access the FollowMyHealth Patient Portal offered by [facility] by registering at the following website: [email or web address]. By joining [employer]’s FollowMyHealth portal, you will also be able to view your health information using other applications (apps) compatible with our system.

## 2020-04-07 NOTE — DISCHARGE NOTE NURSING/CASE MANAGEMENT/SOCIAL WORK - NSCORESITESY/N_GEN_A_CORE_RD
[No

## 2020-04-07 NOTE — DISCHARGE NOTE PROVIDER - HOSPITAL COURSE
38 y/o male with no significant PMH came to the ED complaining of difficulty breathing with minimal exertion, known covid19 pos from community testing, admitted for resp failure.        > acute Hypoxic resp failure sec to CoVID-19 pneumonia,. stable - pos pcr confirmed at another facility prior to admit.    -HCQ    -vitamin supplementation, supportive care    -Prone positioning as long as tolerated    -suplemental o2, titrate to RA as tolerated, not baseline dependent - desat to 87% on RA during ambulation, improves to 97% on 2LNC during ambulation. given the presence of consistent acute respiratory failure w/ hypoxia secondary to confirmed COVID19 infection which is otherwise under control and stable, the patient will need supplemental o2 therapy via NC in order to leave the hospital. due to current circumstances surrounding this pandemic, immediate discharge of this patient in order to increase hospital capacity and mitigate risk of additional spread is paramount, will request home o2 in order to expedite patient discharge to continue quarantine at home or another facility.         #obesity, bmi 31, wt loss advisable outpt fu        Vital Signs Last 24 Hrs    T(C): 36.9 (07 Apr 2020 05:24), Max: 37.1 (06 Apr 2020 13:53)    T(F): 98.5 (07 Apr 2020 05:24), Max: 98.7 (06 Apr 2020 13:53)    HR: 72 (07 Apr 2020 05:24) (72 - 105)    BP: 94/57 (07 Apr 2020 05:24) (92/55 - 110/72)    BP(mean): --    RR: 18 (07 Apr 2020 05:24) (18 - 22)    SpO2: 98% (07 Apr 2020 05:24) (97% - 100%)

## 2020-04-07 NOTE — PROGRESS NOTE ADULT - NSHPATTENDINGPLANDISCUSS_GEN_ALL_CORE
the patient.  All imaging and results of lab/other studies reviewed by me. All questions answered to their satisfaction. At this time they agree with the current plan of therapy.
the patient.  All imaging and results of lab/other studies reviewed by me. All questions answered to their satisfaction. At this time they agree with the current plan of therapy.

## 2020-04-07 NOTE — DISCHARGE NOTE PROVIDER - CARE PROVIDER_API CALL
Kev Lion ()  Internal Medicine  [address] [place] [address]  Phone: [phone]  Fax: [phone]  Established Patient  Follow Up Time:

## 2020-04-07 NOTE — DISCHARGE NOTE PROVIDER - NSDCCPCAREPLAN_GEN_ALL_CORE_FT
PRINCIPAL DISCHARGE DIAGNOSIS  Diagnosis: Pneumonia  Assessment and Plan of Treatment: You have been diagnosed with COVID19, due to coronavirus. It has been determined that you no longer need hospitalization and can recover while remaining in self-quarantine at home for 14 days. You should follow the prevention steps below until a healthcare provider or [facility] says you can return to your normal activities:  - If you been prescribed with any medications, please take them exactly as prescribed with attention to whether they are to be taken regularly or on as needed basis.  -Please remain in quarantine for 14 days, day 1 of your quarantine is the day that you were diagnosed. You should restrict activities outside your home except for getting medical care.    - Do not go to work, school or public areas.  Avoid using public transportation.    - Separate yourself from other people and animals in your home.  Be sure to tell anyone who has been in close contact with you recently to watch for symptoms - they do not need to come to the hospital unless they are having severe symptoms.  - Cover your coughs and sneezes.  Clean your hands often.   - Avoid sharing personal household items.   - Clean all high touch surfaces.   - Lay on your stomach for as long as you can tolerate during the day, ideally at least 2 hours at a time 2x during the day, and even longer if you can.  Monitor your symptoms, if you have a medical emergency call 911.      SECONDARY DISCHARGE DIAGNOSES  Diagnosis: Coronavirus infection  Assessment and Plan of Treatment:     Diagnosis: Hypoxemia  Assessment and Plan of Treatment:

## 2020-04-07 NOTE — PROGRESS NOTE ADULT - SUBJECTIVE AND OBJECTIVE BOX
CC: covid    Patient seen and examined at the bedside. No acute overnight events. no events yesterday. Complaining of sob, occ cough today. Denies fever/chills, headache, lightheadedness, dizziness, chest pain, palpitations, abd pain, nausea/vomiting/diarrhea, muscle pain.      =========================================================================================    PHYSICAL EXAM.    GEN - appears age appropriate. well nourished. pleasant. no distress.   HEENT - NCAT, EOMI, ТАТЬЯНА  RESP - CTA BL, no wheeze/stridor/rhonchi/crackles. occ coughing fit w/ deep inspiration. not on supplemental O2. able to speak in full sentences without distress. no accessory muscle use.   CARDIO - NS1S2, RRR.   ABD - Soft/Non tender/Non distended. Normal BS x4 quadrants. no guarding/rebound tenderness.  Ext - No ALLY.  MSK - BL 5/5 strength on upper and lower extremities.   Neuro - AAOx3. no gross neuro deficits noted  Psych - normal affect  Skin - c/d/i. no rashes/lesions      VITAL SIGNS.    Vital Signs Last 24 Hrs  T(C): 36.9 (07 Apr 2020 05:24), Max: 37.1 (06 Apr 2020 13:53)  T(F): 98.5 (07 Apr 2020 05:24), Max: 98.7 (06 Apr 2020 13:53)  HR: 72 (07 Apr 2020 05:24) (72 - 105)  BP: 94/57 (07 Apr 2020 05:24) (92/55 - 110/72)  BP(mean): --  RR: 18 (07 Apr 2020 05:24) (18 - 22)  SpO2: 98% (07 Apr 2020 05:24) (97% - 100%)        =================================================    LABS.                          14.6   7.05  )-----------( 314      ( 07 Apr 2020 07:11 )             44.9     04-07    135  |  96<L>  |  11.0  ----------------------------<  88  3.9   |  27.0  |  0.82    Ca    8.5<L>      07 Apr 2020 07:11    TPro  7.1  /  Alb  3.5  /  TBili  0.5  /  DBili  x   /  AST  30  /  ALT  60<H>  /  AlkPhos  92  04-07    LIVER FUNCTIONS - ( 07 Apr 2020 07:11 )  Alb: 3.5 g/dL / Pro: 7.1 g/dL / ALK PHOS: 92 U/L / ALT: 60 U/L / AST: 30 U/L / GGT: x             I&O's Summary        ================================================    IMAGING.    ================================================    DIET.    Diet, Regular (04-04-20 @ 21:37)    ================================================    HOME MEDS.    Home Medications:      ================================================    HOSPITAL MEDS.    MEDICATIONS  (STANDING):  amitriptyline 50 milliGRAM(s) Oral daily  ascorbic acid 1000 milliGRAM(s) Oral daily  benzonatate 100 milliGRAM(s) Oral every 8 hours  cholecalciferol 1000 Unit(s) Oral daily  enoxaparin Injectable 40 milliGRAM(s) SubCutaneous daily  hydroxychloroquine   Oral   hydroxychloroquine 200 milliGRAM(s) Oral every 12 hours  thiamine 200 milliGRAM(s) Oral daily    MEDICATIONS  (PRN):  acetaminophen   Tablet .. 650 milliGRAM(s) Oral every 4 hours PRN Temp greater or equal to 38.5C (101.3F)  ALBUTerol    90 MICROgram(s) HFA Inhaler 2 Puff(s) Inhalation every 6 hours PRN Shortness of Breath and/or Wheezing  ALPRAZolam 0.5 milliGRAM(s) Oral two times a day PRN anxiwty

## 2020-04-07 NOTE — PROGRESS NOTE ADULT - ASSESSMENT
40 y/o male with no significant PMH came to the ED complaining of difficulty breathing with minimal exertion, known covid19 pos from community testing, admitted for resp failure.    > acute Hypoxic resp failure sec to CoVID-19 pneumonia,. stable - pos pcr confirmed at another facility prior to admit.  -HCQ, monitor qtc, ekg 4/8 in am   -vitamin supplementation, supportive care  -Prone positioning as long as tolerated  -suplemental o2, titrate to RA as tolerated, not baseline dependent - desat to 87% on RA during ambulation, improves to 97% on 2LNC during ambulation. given the presence of consistent acute respiratory failure w/ hypoxia secondary to confirmed COVID19 infection which is otherwise under control and stable, the patient will need supplemental o2 therapy via NC in order to leave the hospital. due to current circumstances surrounding this pandemic, immediate discharge of this patient in order to increase hospital capacity and mitigate risk of additional spread is paramount, will request home o2 in order to expedite patient discharge to continue quarantine at home or another facility.     #obesity, bmi 31, wt loss advisable outpt fu    >DVT prophylaxis: lovenox    >dispo: pending improvement/resolution of resp failure. if maintaining well on 2-3LNC and inflammatory markers improving will try to dc in am - needs home o2

## 2020-04-07 NOTE — DISCHARGE NOTE PROVIDER - NSDCFUADDAPPT_GEN_ALL_CORE_FT
-Follow up with Primary Care Doctor routinely, but ONLY AFTER you have completed quarantine and ONLY if you DO NOT HAVE SYMPTOMS. Before making an appointment, be sure to call your provider and let them know that you were confirmed/suspected of having Coronavirus so that they may assess if it is safe for you to come to their office. If they do approve you to come in for a visit, be sure to bring a copy of your entire discharge documentation with you to your visit so that they can review what occurred during your hospitalization and make a copy for their records.     IF  YOU HAVE ANY SYMPTOMS THAT YOU FEEL WARRANT MEDICAL CARE OR EVALUATION, PLEASE COME TO THE ER INSTEAD.

## 2020-04-07 NOTE — DISCHARGE NOTE PROVIDER - NSDCMRMEDTOKEN_GEN_ALL_CORE_FT
acetaminophen 325 mg oral tablet: 2 tab(s) orally every 4 hours, As needed, Temp greater or equal to 38.5C (101.3F)  albuterol 90 mcg/inh inhalation aerosol: 2 puff(s) inhaled every 6 hours, As needed, Shortness of Breath and/or Wheezing  ALPRAZolam 0.5 mg oral tablet: 1 tab(s) orally 2 times a day, As needed, anxiwty  amitriptyline 50 mg oral tablet: 1 tab(s) orally once a day  ascorbic acid 1000 mg oral tablet: 1 tab(s) orally once a day  benzonatate 100 mg oral capsule: 1 cap(s) orally every 8 hours  cholecalciferol oral tablet: 1000 unit(s) orally once a day  Plaquenil 200 mg oral tablet: 1 tab(s) orally 2 times a day   thiamine 100 mg oral tablet: 2 tab(s) orally once a day  zinc sulfate 220 mg oral capsule: 1 cap(s) orally once a day

## 2022-11-11 ENCOUNTER — EMERGENCY (EMERGENCY)
Facility: HOSPITAL | Age: 42
LOS: 1 days | Discharge: HOME | End: 2022-11-11
Attending: STUDENT IN AN ORGANIZED HEALTH CARE EDUCATION/TRAINING PROGRAM
Payer: COMMERCIAL

## 2022-11-11 VITALS
TEMPERATURE: 98 F | WEIGHT: 139.99 LBS | SYSTOLIC BLOOD PRESSURE: 155 MMHG | RESPIRATION RATE: 16 BRPM | OXYGEN SATURATION: 99 % | HEART RATE: 76 BPM | DIASTOLIC BLOOD PRESSURE: 98 MMHG

## 2022-11-11 LAB
ALBUMIN SERPL ELPH-MCNC: 4.3 G/DL — SIGNIFICANT CHANGE UP (ref 3.3–5.2)
ALP SERPL-CCNC: 91 U/L — SIGNIFICANT CHANGE UP (ref 40–120)
ALT FLD-CCNC: 29 U/L — SIGNIFICANT CHANGE UP
ANION GAP SERPL CALC-SCNC: 11 MMOL/L — SIGNIFICANT CHANGE UP (ref 5–17)
APTT BLD: 33 SEC — SIGNIFICANT CHANGE UP (ref 27.5–35.5)
AST SERPL-CCNC: 21 U/L — SIGNIFICANT CHANGE UP
BASOPHILS # BLD AUTO: 0.03 K/UL — SIGNIFICANT CHANGE UP (ref 0–0.2)
BASOPHILS NFR BLD AUTO: 0.4 % — SIGNIFICANT CHANGE UP (ref 0–2)
BILIRUB SERPL-MCNC: 0.3 MG/DL — LOW (ref 0.4–2)
BUN SERPL-MCNC: 11.3 MG/DL — SIGNIFICANT CHANGE UP (ref 8–20)
CALCIUM SERPL-MCNC: 8.6 MG/DL — SIGNIFICANT CHANGE UP (ref 8.4–10.5)
CHLORIDE SERPL-SCNC: 106 MMOL/L — SIGNIFICANT CHANGE UP (ref 96–108)
CO2 SERPL-SCNC: 25 MMOL/L — SIGNIFICANT CHANGE UP (ref 22–29)
CREAT SERPL-MCNC: 0.92 MG/DL — SIGNIFICANT CHANGE UP (ref 0.5–1.3)
EGFR: 109 ML/MIN/1.73M2 — SIGNIFICANT CHANGE UP
EOSINOPHIL # BLD AUTO: 0.34 K/UL — SIGNIFICANT CHANGE UP (ref 0–0.5)
EOSINOPHIL NFR BLD AUTO: 5 % — SIGNIFICANT CHANGE UP (ref 0–6)
GLUCOSE SERPL-MCNC: 87 MG/DL — SIGNIFICANT CHANGE UP (ref 70–99)
HCT VFR BLD CALC: 42.3 % — SIGNIFICANT CHANGE UP (ref 39–50)
HGB BLD-MCNC: 14.3 G/DL — SIGNIFICANT CHANGE UP (ref 13–17)
IMM GRANULOCYTES NFR BLD AUTO: 0.3 % — SIGNIFICANT CHANGE UP (ref 0–0.9)
INR BLD: 1.09 RATIO — SIGNIFICANT CHANGE UP (ref 0.88–1.16)
LACTATE BLDV-MCNC: 0.6 MMOL/L — SIGNIFICANT CHANGE UP (ref 0.5–2)
LIDOCAIN IGE QN: 21 U/L — LOW (ref 22–51)
LYMPHOCYTES # BLD AUTO: 1.85 K/UL — SIGNIFICANT CHANGE UP (ref 1–3.3)
LYMPHOCYTES # BLD AUTO: 27.4 % — SIGNIFICANT CHANGE UP (ref 13–44)
MAGNESIUM SERPL-MCNC: 2.1 MG/DL — SIGNIFICANT CHANGE UP (ref 1.6–2.6)
MCHC RBC-ENTMCNC: 30 PG — SIGNIFICANT CHANGE UP (ref 27–34)
MCHC RBC-ENTMCNC: 33.8 GM/DL — SIGNIFICANT CHANGE UP (ref 32–36)
MCV RBC AUTO: 88.9 FL — SIGNIFICANT CHANGE UP (ref 80–100)
MONOCYTES # BLD AUTO: 0.52 K/UL — SIGNIFICANT CHANGE UP (ref 0–0.9)
MONOCYTES NFR BLD AUTO: 7.7 % — SIGNIFICANT CHANGE UP (ref 2–14)
NEUTROPHILS # BLD AUTO: 3.98 K/UL — SIGNIFICANT CHANGE UP (ref 1.8–7.4)
NEUTROPHILS NFR BLD AUTO: 59.2 % — SIGNIFICANT CHANGE UP (ref 43–77)
PLATELET # BLD AUTO: 286 K/UL — SIGNIFICANT CHANGE UP (ref 150–400)
POTASSIUM SERPL-MCNC: 3.8 MMOL/L — SIGNIFICANT CHANGE UP (ref 3.5–5.3)
POTASSIUM SERPL-SCNC: 3.8 MMOL/L — SIGNIFICANT CHANGE UP (ref 3.5–5.3)
PROT SERPL-MCNC: 7.1 G/DL — SIGNIFICANT CHANGE UP (ref 6.6–8.7)
PROTHROM AB SERPL-ACNC: 12.6 SEC — SIGNIFICANT CHANGE UP (ref 10.5–13.4)
RBC # BLD: 4.76 M/UL — SIGNIFICANT CHANGE UP (ref 4.2–5.8)
RBC # FLD: 12.7 % — SIGNIFICANT CHANGE UP (ref 10.3–14.5)
SODIUM SERPL-SCNC: 142 MMOL/L — SIGNIFICANT CHANGE UP (ref 135–145)
WBC # BLD: 6.74 K/UL — SIGNIFICANT CHANGE UP (ref 3.8–10.5)
WBC # FLD AUTO: 6.74 K/UL — SIGNIFICANT CHANGE UP (ref 3.8–10.5)

## 2022-11-11 PROCEDURE — 99285 EMERGENCY DEPT VISIT HI MDM: CPT

## 2022-11-11 RX ORDER — ONDANSETRON 8 MG/1
8 TABLET, FILM COATED ORAL ONCE
Refills: 0 | Status: COMPLETED | OUTPATIENT
Start: 2022-11-11 | End: 2022-11-11

## 2022-11-11 RX ORDER — SODIUM CHLORIDE 9 MG/ML
1000 INJECTION INTRAMUSCULAR; INTRAVENOUS; SUBCUTANEOUS ONCE
Refills: 0 | Status: COMPLETED | OUTPATIENT
Start: 2022-11-11 | End: 2022-11-11

## 2022-11-11 RX ORDER — MORPHINE SULFATE 50 MG/1
4 CAPSULE, EXTENDED RELEASE ORAL ONCE
Refills: 0 | Status: DISCONTINUED | OUTPATIENT
Start: 2022-11-11 | End: 2022-11-11

## 2022-11-11 RX ADMIN — SODIUM CHLORIDE 1000 MILLILITER(S): 9 INJECTION INTRAMUSCULAR; INTRAVENOUS; SUBCUTANEOUS at 23:18

## 2022-11-11 RX ADMIN — MORPHINE SULFATE 4 MILLIGRAM(S): 50 CAPSULE, EXTENDED RELEASE ORAL at 23:18

## 2022-11-11 RX ADMIN — ONDANSETRON 8 MILLIGRAM(S): 8 TABLET, FILM COATED ORAL at 23:09

## 2022-11-11 NOTE — ED PROVIDER NOTE - PROGRESS NOTE DETAILS
POLO- ct as described, Pt reassessed, pt feeling better at this time, vss, pt able to walk, talk and vocalized plan of action. Discussed in depth and explained to pt in depth the next steps that need to be taking including proper follow up with PCP or specialists. All incidental findings were discussed with pt as well. Pt verbalized their concerns and all questions were answered. Pt understands dispo and wants discharge. Given good instructions when to return to ED and importance of f/u.

## 2022-11-11 NOTE — ED ADULT TRIAGE NOTE - CHIEF COMPLAINT QUOTE
Ambulatory to ED c/o upper abdominal pain w/ episodes of diarrhea x5 days. Patient reports episodes of vomiting which has since subsided.

## 2022-11-11 NOTE — ED PROVIDER NOTE - PHYSICAL EXAMINATION
Gen: Alert, NAD  Head: NC, AT, PERRL, EOMI, normal lids/conjunctiva  ENT: normal hearing, patent oropharynx without erythema/exudate, uvula midline  Neck: +supple, no tenderness/meningismus/JVD, +Trachea midline  Pulm: Bilateral BS, normal resp effort, no wheeze/stridor/retractions  CV: RRR, no R/G, +dist pulses  Abd: soft, upper abd TTP, lower is NT/ND, +BS, no hepatosplenomegaly  Mskel: no edema/erythema/cyanosis  Skin: no rash  Neuro: AAOx3, no gross sensory/motor deficits

## 2022-11-11 NOTE — ED PROVIDER NOTE - PATIENT PORTAL LINK FT
You can access the FollowMyHealth Patient Portal offered by [facility] by registering at the following website: [email or web address] By joining [facility]’s FollowMyHealth portal, you will also be able to view your health information using other applications (apps) compatible with our system.

## 2022-11-11 NOTE — ED PROVIDER NOTE - CARE PROVIDER_API CALL
Felipe Mayberry)  Gastroenterology; Internal Medicine  [address] [place] [address]  Phone: [phone]  Fax: [phone]  Follow Up Time:

## 2022-11-11 NOTE — ED ADULT NURSE NOTE - OBJECTIVE STATEMENT
Assumed care of patient in pr-c. Pt a&ox4 rr even and unlabored with no signicant pmhx presents to ed with general abdominal pain x5 days, worsened the past 3 days accompanied with diarrhea. Pt reports being on Truvada for HIV exposure. Pt denies chest pain, sob, fever, numbness/tingling, rashes, changes in vision. pt educated on plan of care, pt able to successfully teach back plan of care to RN, RN will continue to reeducate pt during hospital stay.

## 2022-11-11 NOTE — ED PROVIDER NOTE - CLINICAL SUMMARY MEDICAL DECISION MAKING FREE TEXT BOX
Patient presents with diarrhea.  Exam as above.  Patient pending labs and CT.  Patient signed out to incoming physician.  All decisions regarding the progression of care will be made at their discretion.

## 2022-11-11 NOTE — ED PROVIDER NOTE - NS ED ATTENDING STATEMENT MOD
I have seen and examined this patient and fully participated in the care of this patient as the teaching attending.  The service was shared with the GABBY.  I reviewed and verified the documentation and independently performed the documented:

## 2022-11-11 NOTE — ED PROVIDER NOTE - OBJECTIVE STATEMENT
Pertinent PMH/PSH/FHx/SHx and Review of Systems contained within:  Patient presents to the ED for diarrhea.  no PMH.  Currently on PEP (Truvada) for HIV exposure (no receptive penetration).  Patient states diarrhea started about 5 days ago and then acutely worsened over the past 3 days.  High frequency, low volume, and watery.  Otherwise baseline.  VSS.  Non toxic.  No aggravating or relieving factors. No other pertinent PMH.  No other pertinent PSH.  No other pertinent FHx.  Patient denies EtOH/tobacco/illicit substance use. No fever/chills, No photophobia/eye pain/changes in vision, No ear pain/sore throat/dysphagia, No chest pain/palpitations, no SOB/cough/wheeze/stridor,  no dysuria/frequency/discharge, No neck/back pain, no rash, no changes in neurological status/function.

## 2022-11-12 LAB — HIV 1 & 2 AB SERPL IA.RAPID: SIGNIFICANT CHANGE UP

## 2022-11-12 PROCEDURE — 80053 COMPREHEN METABOLIC PANEL: CPT

## 2022-11-12 PROCEDURE — 96374 THER/PROPH/DIAG INJ IV PUSH: CPT

## 2022-11-12 PROCEDURE — 83735 ASSAY OF MAGNESIUM: CPT

## 2022-11-12 PROCEDURE — 96375 TX/PRO/DX INJ NEW DRUG ADDON: CPT

## 2022-11-12 PROCEDURE — 96361 HYDRATE IV INFUSION ADD-ON: CPT

## 2022-11-12 PROCEDURE — 83605 ASSAY OF LACTIC ACID: CPT

## 2022-11-12 PROCEDURE — 85610 PROTHROMBIN TIME: CPT

## 2022-11-12 PROCEDURE — 36415 COLL VENOUS BLD VENIPUNCTURE: CPT

## 2022-11-12 PROCEDURE — 99284 EMERGENCY DEPT VISIT MOD MDM: CPT | Mod: 25

## 2022-11-12 PROCEDURE — 85025 COMPLETE CBC W/AUTO DIFF WBC: CPT

## 2022-11-12 PROCEDURE — 85730 THROMBOPLASTIN TIME PARTIAL: CPT

## 2022-11-12 PROCEDURE — 86703 HIV-1/HIV-2 1 RESULT ANTBDY: CPT

## 2022-11-12 PROCEDURE — 83690 ASSAY OF LIPASE: CPT

## 2022-11-12 PROCEDURE — 74177 CT ABD & PELVIS W/CONTRAST: CPT | Mod: MA

## 2022-11-12 PROCEDURE — 74177 CT ABD & PELVIS W/CONTRAST: CPT | Mod: 26,MA

## 2023-02-28 ENCOUNTER — NON-APPOINTMENT (OUTPATIENT)
Age: 43
End: 2023-02-28

## 2024-07-17 NOTE — ED ADULT NURSE NOTE - EAR DISTURBANCES
Surgeon: Kerry Escobar MD    Preoperative and post diagnosis: Anal tag     Procedure:anal tag excision , * Panel 2 does not exist *Estimated Blood Loss: minimal    Specimens:   Specimens       ID Source Type Tests Collected By Collected At Frozen?    A Anus Tissue TISSUE PATHOLOGY EXAM   Kerry Escobar MD 7/17/24 1144     Description: ANAL TAG           Order Name Source Comment Collection Info Order Time   TISSUE PATHOLOGY EXAM Anus  Collected By: Kerry Escobar MD 7/17/2024 11:44 AM     Release to patient   Routine Release            Indication:  Belle Gregorio is a 45 y.o. female who comes in with anal tag  Patient understands risks, benefits,and alternatives wishes to proceed.      Procedure Details:  After colonoscopy was done and was normal, I excised the posterior anal tag.  Quarter percent Marcaine with epi was used as a local infiltration.  I excised the tag off at the base.  I did a running 2-0 Vicryl to close the incision.  Patient tolerated well.  
normal

## 2024-08-19 ENCOUNTER — INPATIENT (INPATIENT)
Facility: HOSPITAL | Age: 44
LOS: 2 days | Discharge: HOME | DRG: 882 | End: 2024-08-22
Attending: PSYCHIATRY & NEUROLOGY | Admitting: PSYCHIATRY & NEUROLOGY
Payer: COMMERCIAL

## 2024-08-19 VITALS
HEART RATE: 82 BPM | DIASTOLIC BLOOD PRESSURE: 87 MMHG | SYSTOLIC BLOOD PRESSURE: 129 MMHG | WEIGHT: 184.97 LBS | TEMPERATURE: 98 F

## 2024-08-19 RX ORDER — HALOPERIDOL 1 MG
5 TABLET ORAL EVERY 6 HOURS
Refills: 0 | Status: DISCONTINUED | OUTPATIENT
Start: 2024-08-19 | End: 2024-08-22

## 2024-08-19 RX ORDER — THIAMINE HCL 250 MG
100 TABLET ORAL DAILY
Refills: 0 | Status: COMPLETED | OUTPATIENT
Start: 2024-08-19 | End: 2024-08-22

## 2024-08-19 RX ORDER — ACETAMINOPHEN 325 MG/1
325 TABLET ORAL EVERY 4 HOURS
Refills: 0 | Status: DISCONTINUED | OUTPATIENT
Start: 2024-08-19 | End: 2024-08-22

## 2024-08-19 RX ORDER — LORAZEPAM 4 MG/ML
2 INJECTION INTRAMUSCULAR; INTRAVENOUS EVERY 6 HOURS
Refills: 0 | Status: DISCONTINUED | OUTPATIENT
Start: 2024-08-19 | End: 2024-08-20

## 2024-08-19 RX ORDER — CHLORDIAZEPOXIDE HCL 5 MG
25 CAPSULE ORAL EVERY 4 HOURS
Refills: 0 | Status: DISCONTINUED | OUTPATIENT
Start: 2024-08-19 | End: 2024-08-20

## 2024-08-19 RX ORDER — CHLORDIAZEPOXIDE HCL 5 MG
50 CAPSULE ORAL EVERY 4 HOURS
Refills: 0 | Status: DISCONTINUED | OUTPATIENT
Start: 2024-08-19 | End: 2024-08-20

## 2024-08-19 RX ORDER — LORAZEPAM 4 MG/ML
2 INJECTION INTRAMUSCULAR; INTRAVENOUS ONCE
Refills: 0 | Status: DISCONTINUED | OUTPATIENT
Start: 2024-08-19 | End: 2024-08-19

## 2024-08-19 RX ORDER — MAGNESIUM, ALUMINUM HYDROXIDE 200-225/5
30 SUSPENSION, ORAL (FINAL DOSE FORM) ORAL EVERY 4 HOURS
Refills: 0 | Status: DISCONTINUED | OUTPATIENT
Start: 2024-08-19 | End: 2024-08-22

## 2024-08-19 RX ORDER — ACETAMINOPHEN 325 MG/1
650 TABLET ORAL EVERY 6 HOURS
Refills: 0 | Status: DISCONTINUED | OUTPATIENT
Start: 2024-08-19 | End: 2024-08-22

## 2024-08-19 RX ADMIN — LORAZEPAM 2 MILLIGRAM(S): 4 INJECTION INTRAMUSCULAR; INTRAVENOUS at 23:30

## 2024-08-19 NOTE — BH CHART NOTE - NSEVENTNOTEFT_PSY_ALL_CORE
Reason for admission: Suicide attempt    Patient is a 43 year old, male; domiciled with sister; engaged to partner who lives in [facility]; noncaregiver; employed; past psychiatric history of PTSD; one prior psychiatric hospitalization at 15 yo at [facility]; domiciled at several group homes as an adolescent inc [place]; 3 prior suicide attempts; no known history of violence or arrests; no significant PMH; brought in by EMS; called by sister; presenting s/p suicide attempt by tying an HDMI cable cord around his neck in the setting of EtOH use;  upon arrival.     On evaluation in [facility], patient was calm, cooperative, in NAD, had mild tremors from alcohol withdrawal. He denies current suicidal ideation, reports his SA was in the context of becoming disinhibited from alcohol and feeling overwhelmed and depressed. He endorses taking 1-2 Xanax PRN per day. He reports drinking 12-pack of beers every couple of days. He reports hx of having chills/shakes when he goes too long without drinking. He denies history of detox/rehab, denies history of DT, hx of withdrawal seizures. His last drink was 24 hours ago.     MSE:   · Level of Consciousness	Alert  · General Appearance	No deformities present  · Body Habitus	Average build  · Hygiene	Good  · Grooming	Good  · Behavior	Cooperative  · Eye Contact	Good  · Relatedness	Good  · Impulse Control	Normal  · Muscle Tone/Strength	Normal muscle tone/strength  · Abnormal Movements	No abnormal movements  · Gait/Station	Normal gait / station  · Speech	Normal volume, rate, productivity, spontaneity and articulation  · Mood	Depressed  · Affect Quality	Depressed, reactive  · Affect Range	good range  · Affect Congruence	Congruent  · Thought Process	Linear  · Thought Associations	Normal  · Thought Content	Guilt, no delusional content endorsed  · Perceptions	No abnormalities  · Orientation	Oriented to time, place, person, situation  · Attention/Concentration	Normal  · Estimated Intelligence	Average  · Recent Memory	Normal  · Remote Memory	Normal  · Fund of Knowledge	Normal  · How Fund of Knowledge Assessed	Vocabulary  · Language	No abnormalities noted  · Judgment	Limited  · Insight	Fair    PE -   General: Unremarkable appearance, no gross deformities  HEENT: Atraumatic, normocephalic  Chest/respiratory: Normal chest rise, breathing comfortably RR ~15  Abdomen: Non-distended  Extremities: Well-perfused  Skin: Pink, dry  Neuro: Cranial nerves intact, no focal deficits on evaluation    Plan:  - proceed with admission to [facility] under [facility]; part B signed of admission application  - not currently presenting with SI, considered low risk of suicidal behaviors or violence while admitted inpatient based on evaluation, no indication for CO 1:1 at this time; routine observation appropriate  - will defer starting on antidepressant (Amitriptyline) to day team in case they want to switch to different agent  - One time Ativan tonight, 2mg PO, for sleep, anxiety, and mild alcohol withdrawal  - Started on low-risk CIWA protocol; no risk of seizures, DTS, or hallucinosis:  	- Vitals q4h  	- Librium 25mg PO Q4H PRN for CIWA 9-15  	- Librium 50mg PO Q4H PRN for CIWA > 15 and notify MD

## 2024-08-19 NOTE — BH PATIENT PROFILE - HOME MEDICATIONS
Home Oxygen , Oxygen via Continuous Nasal Cannula  Concentrator and Homefill Portable conserving device.  2L/min via nasal cannula  Dx:   Duration:   ALPRAZolam 0.5 mg oral tablet , 1 tab(s) orally 2 times a day, As needed, anxiwty  zinc sulfate 220 mg oral capsule , 1 cap(s) orally once a day   thiamine 100 mg oral tablet , 2 tab(s) orally once a day  cholecalciferol oral tablet , 1000 unit(s) orally once a day  ascorbic acid 1000 mg oral tablet , 1 tab(s) orally once a day  albuterol 90 mcg/inh inhalation aerosol , 2 puff(s) inhaled every 6 hours, As needed, Shortness of Breath and/or Wheezing  benzonatate 100 mg oral capsule , 1 cap(s) orally every 8 hours  Plaquenil 200 mg oral tablet , 1 tab(s) orally 2 times a day   amitriptyline 50 mg oral tablet , 1 tab(s) orally once a day  acetaminophen 325 mg oral tablet , 2 tab(s) orally every 4 hours, As needed, Temp greater or equal to 38.5C (101.3F)

## 2024-08-19 NOTE — BH PATIENT PROFILE - SURGICAL SITE INCISION
Called and left a message for Everton to call back regarding lab tests and additional studies from our department. Plan to schedule him for a colonoscopy with [facility] next Tuesday or Thursday with Dr. Allen in addition to a MRCP to evaluate his rising alk phos and follow-up with his diarrhea to make sure that he is staying adequately hydrated with his rising kidney function. A callback number was left to return the call.   no

## 2024-08-20 DIAGNOSIS — F43.10 POST-TRAUMATIC STRESS DISORDER, UNSPECIFIED: ICD-10-CM

## 2024-08-20 DIAGNOSIS — F10.20 ALCOHOL DEPENDENCE, UNCOMPLICATED: ICD-10-CM

## 2024-08-20 LAB — TSH SERPL-MCNC: 2.63 UIU/ML — SIGNIFICANT CHANGE UP (ref 0.27–4.2)

## 2024-08-20 PROCEDURE — 99223 1ST HOSP IP/OBS HIGH 75: CPT

## 2024-08-20 RX ORDER — AMITRIPTYLINE HCL 25 MG
100 TABLET ORAL AT BEDTIME
Refills: 0 | Status: DISCONTINUED | OUTPATIENT
Start: 2024-08-20 | End: 2024-08-20

## 2024-08-20 RX ORDER — ESCITALOPRAM OXALATE 10 MG/1
10 TABLET ORAL DAILY
Refills: 0 | Status: DISCONTINUED | OUTPATIENT
Start: 2024-08-21 | End: 2024-08-22

## 2024-08-20 RX ORDER — PRAZOSIN HCL 2 MG
1 CAPSULE ORAL AT BEDTIME
Refills: 0 | Status: DISCONTINUED | OUTPATIENT
Start: 2024-08-20 | End: 2024-08-22

## 2024-08-20 RX ORDER — LORAZEPAM 4 MG/ML
2 INJECTION INTRAMUSCULAR; INTRAVENOUS EVERY 6 HOURS
Refills: 0 | Status: DISCONTINUED | OUTPATIENT
Start: 2024-08-20 | End: 2024-08-22

## 2024-08-20 RX ADMIN — Medication 100 MILLIGRAM(S): at 10:34

## 2024-08-20 RX ADMIN — LORAZEPAM 2 MILLIGRAM(S): 4 INJECTION INTRAMUSCULAR; INTRAVENOUS at 16:43

## 2024-08-20 RX ADMIN — Medication 1 TABLET(S): at 10:33

## 2024-08-20 RX ADMIN — LORAZEPAM 2 MILLIGRAM(S): 4 INJECTION INTRAMUSCULAR; INTRAVENOUS at 10:33

## 2024-08-20 RX ADMIN — Medication 2 MILLIGRAM(S): at 21:58

## 2024-08-20 RX ADMIN — Medication 50 MILLIGRAM(S): at 21:47
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
## 2024-08-20 NOTE — BH INPATIENT PSYCHIATRY ASSESSMENT NOTE - CURRENT MEDICATION
MEDICATIONS  (STANDING):  amitriptyline 100 milliGRAM(s) Oral at bedtime  multivitamin 1 Tablet(s) Oral daily  prazosin. 1 milliGRAM(s) Oral at bedtime  thiamine 100 milliGRAM(s) Oral daily    MEDICATIONS  (PRN):  acetaminophen     Tablet .. 650 milliGRAM(s) Oral every 6 hours PRN Temp greater or equal to 38.5C (101.3F), Moderate Pain (4 - 6)  acetaminophen     Tablet .. 325 milliGRAM(s) Oral every 4 hours PRN Temp greater or equal to 38C (100.4F), Mild Pain (1 - 3)  aluminum hydroxide/magnesium hydroxide/simethicone Suspension 30 milliLiter(s) Oral every 4 hours PRN Dyspepsia  chlordiazePOXIDE 25 milliGRAM(s) Oral every 4 hours PRN Alcohol Withdrawal Symptoms  chlordiazePOXIDE 50 milliGRAM(s) Oral every 4 hours PRN Alcohol Withdrawal Symptoms  haloperidol     Tablet 5 milliGRAM(s) Oral every 6 hours PRN Agitation  LORazepam     Tablet 2 milliGRAM(s) Oral every 6 hours PRN Agitation  melatonin. 3 milliGRAM(s) Oral at bedtime PRN Insomnia   MEDICATIONS  (STANDING):  multivitamin 1 Tablet(s) Oral daily  prazosin. 1 milliGRAM(s) Oral at bedtime  QUEtiapine 50 milliGRAM(s) Oral at bedtime  thiamine 100 milliGRAM(s) Oral daily    MEDICATIONS  (PRN):  acetaminophen     Tablet .. 650 milliGRAM(s) Oral every 6 hours PRN Temp greater or equal to 38.5C (101.3F), Moderate Pain (4 - 6)  acetaminophen     Tablet .. 325 milliGRAM(s) Oral every 4 hours PRN Temp greater or equal to 38C (100.4F), Mild Pain (1 - 3)  aluminum hydroxide/magnesium hydroxide/simethicone Suspension 30 milliLiter(s) Oral every 4 hours PRN Dyspepsia  haloperidol     Tablet 5 milliGRAM(s) Oral every 6 hours PRN Agitation  LORazepam     Tablet 2 milliGRAM(s) Oral every 6 hours PRN EtOH withdrawal  melatonin. 3 milliGRAM(s) Oral at bedtime PRN Insomnia  nicotine  Polacrilex Gum 2 milliGRAM(s) Oral every 6 hours PRN Smoking Cessation
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
## 2024-08-20 NOTE — BH INPATIENT PSYCHIATRY ASSESSMENT NOTE - NSBHCHARTREVIEWVS_PSY_A_CORE FT
Vital Signs Last 24 Hrs  T(C): 36.4 (08-20-24 @ 10:19), Max: 36.9 (08-19-24 @ 22:20)  T(F): 97.5 (08-20-24 @ 10:19), Max: 98.4 (08-19-24 @ 22:20)  HR: 107 (08-20-24 @ 10:19) (82 - 107)  BP: 146/97 (08-20-24 @ 10:19) (116/69 - 146/97)  BP(mean): --  RR: 18 (08-19-24 @ 19:36) (18 - 18)  SpO2: 96% (08-20-24 @ 10:19) (96% - 98%)     Vital Signs Last 24 Hrs  T(C): 37.1 (08-20-24 @ 16:54), Max: 37.1 (08-20-24 @ 16:54)  T(F): 98.8 (08-20-24 @ 16:54), Max: 98.8 (08-20-24 @ 16:54)  HR: 111 (08-20-24 @ 16:54) (82 - 111)  BP: 134/89 (08-20-24 @ 16:54) (116/69 - 146/97)  BP(mean): --  RR: 18 (08-19-24 @ 19:36) (18 - 18)  SpO2: 96% (08-20-24 @ 16:54) (96% - 98%)
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
## 2024-08-20 NOTE — BH SOCIAL WORK INITIAL PSYCHOSOCIAL EVALUATION - NSCMSPTSTRENGTHS_PSY_ALL_CORE
Expressive of emotions/Highly motivated for treatment/Intelligence/Physically healthy/Supportive family

## 2024-08-20 NOTE — BH SOCIAL WORK INITIAL PSYCHOSOCIAL EVALUATION - OTHER PAST PSYCHIATRIC HISTORY (INCLUDE DETAILS REGARDING ONSET, COURSE OF ILLNESS, INPATIENT/OUTPATIENT TREATMENT)
43 y.o. domiciled with sister, engaged to partner who lives in [facility], non-caregiver, employed, with no significant PMH, PPH of PTSD, one prior remote psychiatric hospitalization at age 14, three prior suicide attempts per chart, no known history of violence or arrests, no known history of complicated alcohol withdrawal, who was admitted to [facility] as a transfer from [facility] where he presented on 8/19/24 BIBA activated by his sister after she encountered the patient with an HDMI cord wrapped around his neck while intoxicated in the company of his cousin.   BAL was 213 upon arrival.
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
## 2024-08-20 NOTE — BH INPATIENT PSYCHIATRY ASSESSMENT NOTE - NSBHMETABOLIC_PSY_ALL_CORE_FT
BMI: BMI (kg/m2): 34 (08-19-24 @ 16:14)  HbA1c:   Glucose:   BP: 146/97 (08-20-24 @ 10:19) (116/69 - 146/97)Vital Signs Last 24 Hrs  T(C): 36.4 (08-20-24 @ 10:19), Max: 36.9 (08-19-24 @ 22:20)  T(F): 97.5 (08-20-24 @ 10:19), Max: 98.4 (08-19-24 @ 22:20)  HR: 107 (08-20-24 @ 10:19) (82 - 107)  BP: 146/97 (08-20-24 @ 10:19) (116/69 - 146/97)  BP(mean): --  RR: 18 (08-19-24 @ 19:36) (18 - 18)  SpO2: 96% (08-20-24 @ 10:19) (96% - 98%)      Lipid Panel:  BMI: BMI (kg/m2): 34 (08-19-24 @ 16:14)  HbA1c:   Glucose:   BP: 134/89 (08-20-24 @ 16:54) (116/69 - 146/97)Vital Signs Last 24 Hrs  T(C): 37.1 (08-20-24 @ 16:54), Max: 37.1 (08-20-24 @ 16:54)  T(F): 98.8 (08-20-24 @ 16:54), Max: 98.8 (08-20-24 @ 16:54)  HR: 111 (08-20-24 @ 16:54) (82 - 111)  BP: 134/89 (08-20-24 @ 16:54) (116/69 - 146/97)  BP(mean): --  RR: 18 (08-19-24 @ 19:36) (18 - 18)  SpO2: 96% (08-20-24 @ 16:54) (96% - 98%)      Lipid Panel:

## 2024-08-20 NOTE — BH INPATIENT PSYCHIATRY ASSESSMENT NOTE - DESCRIPTION
The patient lives with his family. He is engaged to a man who lives in [facility] and is in the process of petitioning for his fiancé to come to the [place]; this process has been ongoing for the past 14 months. 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 He finished 10th grade and has not competed his GED. Regarding substance use, the patient smokes 4-5 cigarettes daily when at work. He reported that he has been struggling with alcohol for the past 5-6 months, during which time he has been drinking 10-15 beers 3 times weekly.

## 2024-08-20 NOTE — BH INPATIENT PSYCHIATRY ASSESSMENT NOTE - NSBHSAALC_PSY_A_CORE FT
The patient reported that he has been struggling with alcohol for the past 5-6 months, during which time he has been drinking 10-15 beers 3 times weekly.
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
## 2024-08-20 NOTE — BH INPATIENT PSYCHIATRY ASSESSMENT NOTE - RISK ASSESSMENT
Static: Male sex  Protective: Supportive family  Modifiable: Medication optimization  Current Risk: LOW  Current risk d/t mood episode will be mitigated with medication (i.e., escitalopram) Static: Male sex  Protective: Supportive family  Modifiable: Medication optimization  Current Risk: LOW  Current risk d/t mood episode will be mitigated with medication (i.e. escitalopram)

## 2024-08-20 NOTE — BH INPATIENT PSYCHIATRY ASSESSMENT NOTE - ADDITIONAL DETAILS ALL
The patient's history of suicide attempts is somewhat inconsistent. Will seek to obtain collateral from outpatient provider.
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
## 2024-08-20 NOTE — BH INPATIENT PSYCHIATRY ASSESSMENT NOTE - NSBHASSESSSUMMFT_PSY_ALL_CORE
43 y.o. domiciled with sister, engaged to partner who lives in [facility], non-caregiver, employed, with no significant PMH, PPH of PTSD, one prior remote psychiatric hospitalization at age 14, three prior suicide attempts per chart, no known history of violence or arrests, no known history of complicated alcohol withdrawal, who was admitted to [facility] as a transfer from [facility] where he presented on 8/19/24 BIBA activated by his sister after she encountered the patient with an HDMI cord wrapped around his neck while intoxicated in the company of his cousin.    The patient reports poor sleep, anxiety, paranoia (on the days following alcohol intoxication), panic attacks, nightmares, intermittent restlessness, and possible recent suicidal ideation without a plan after receiving bad news from his . Mental status exam is notable for anxious affect. The patient’s presentation and recent history suggest that his PTSD symptoms are being exacerbated by psychosocial stressors and alcohol use/withdrawal. It seems like he uses alcohol and benzodiazepines to reduce high levels of anxiety, and in turn, he experiences worsening anxiety due to withdrawal, which worsens his PTSD symptoms. He will benefit from continued hospitalization for symptomatic stabilization, which will likely result from abstinence from alcohol in combination with modest medication changes.    PLAN:  #Legal  -9.27 involuntary admission (2PC)    #Safety  -Routine observation every 15 minutes    #Psychopharm  - Start quetiapine 50 mg nightly  - Start escitalopram 10 mg daily  - Start prazosin 1 mg at bedtime for nightmares  - PRNs available include     --Haloperidol 5 mg every 6 hours as needed for agitation     --Lorazepam 2 mg every 6 hours as needed for signs/symptoms of alcohol withdrawal or anxiety     --Melatonin 3 mg nightly as needed for insomnia    #Medical  -Follow up A1c and lipid panel [ordered]    #Therapy  -Encourage participation in group and individual therapy    #Disposition  -Continue inpatient treatment  -Further dispo pending symptomatic improvement    #Psychosocial  -Collateral: Sister Yuni ([phone])

## 2024-08-20 NOTE — BH INPATIENT PSYCHIATRY ASSESSMENT NOTE - NSDCCRITERIA_PSY_ALL_CORE
Improvement in anxiety, resolution of suicidality, and willingness to engage in safe discharge planning

## 2024-08-20 NOTE — BH INPATIENT PSYCHIATRY ASSESSMENT NOTE - NSBHATTESTCOMMENTATTENDFT_PSY_A_CORE
43 y.o. domiciled with sister, engaged to partner who lives in [facility], non-caregiver, employed, with no significant PMH, PPH of PTSD, one prior remote psychiatric hospitalization at age 14, three prior suicide attempts per chart, no known history of violence or arrests, no known history of complicated alcohol withdrawal, who was admitted to [facility] as a transfer from [facility] where he presented on 8/19/24 BIBA activated by his sister after she encountered the patient with an HDMI cord wrapped around his neck while intoxicated in the company of his cousin.  Will start medication regimen to address pt's mood/anxiety, alcohol, and PTSD sx.
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
## 2024-08-21 LAB
A1C WITH ESTIMATED AVERAGE GLUCOSE RESULT: 5.3 % — SIGNIFICANT CHANGE UP (ref 4–5.6)
CHOLEST SERPL-MCNC: 231 MG/DL — HIGH
ESTIMATED AVERAGE GLUCOSE: 105 MG/DL — SIGNIFICANT CHANGE UP (ref 68–114)
HDLC SERPL-MCNC: 56 MG/DL — SIGNIFICANT CHANGE UP
LIPID PNL WITH DIRECT LDL SERPL: 143 MG/DL — HIGH
NON HDL CHOLESTEROL: 175 MG/DL — HIGH
TRIGL SERPL-MCNC: 181 MG/DL — HIGH
VIT B12 SERPL-MCNC: 661 PG/ML — SIGNIFICANT CHANGE UP (ref 232–1245)

## 2024-08-21 PROCEDURE — 99232 SBSQ HOSP IP/OBS MODERATE 35: CPT

## 2024-08-21 RX ORDER — PRAZOSIN HCL 2 MG
1 CAPSULE ORAL
Qty: 14 | Refills: 0
Start: 2024-08-21 | End: 2024-09-03

## 2024-08-21 RX ORDER — AMITRIPTYLINE HCL 25 MG
1 TABLET ORAL
Refills: 0 | DISCHARGE

## 2024-08-21 RX ORDER — ESCITALOPRAM OXALATE 10 MG/1
1 TABLET ORAL
Qty: 14 | Refills: 0
Start: 2024-08-21 | End: 2024-09-03

## 2024-08-21 RX ORDER — ALPRAZOLAM 0.25 MG
1 TABLET ORAL
Refills: 0 | DISCHARGE

## 2024-08-21 RX ADMIN — ESCITALOPRAM OXALATE 10 MILLIGRAM(S): 10 TABLET ORAL at 10:49

## 2024-08-21 RX ADMIN — Medication 100 MILLIGRAM(S): at 10:48

## 2024-08-21 RX ADMIN — Medication 1 TABLET(S): at 10:48

## 2024-08-21 RX ADMIN — Medication 1 MILLIGRAM(S): at 21:07

## 2024-08-21 RX ADMIN — Medication 3 MILLIGRAM(S): at 21:07

## 2024-08-21 RX ADMIN — Medication 50 MILLIGRAM(S): at 21:07

## 2024-08-21 NOTE — BH INPATIENT PSYCHIATRY PROGRESS NOTE - NSBHFUPINTERVALHXFT_PSY_A_CORE
Since the last progress note, there have been no significant behavioral episodes. The patient has been adherent to scheduled medications; he did not received scheduled prazosin, reportedly because it was not offered. The patient received PRN lorazepam 2 mg at 1643.     Upon evaluation today, the patient was encountered in the common area talking on the patient phone and amenable to meeting with treatment team members in his room after finishing the phone call. He was calm with bright affect, and he reported feeling "100 times better." The patient reported that he slept well, possibly attributable to receiving quetiapine for the first time last night. He expressed feeling open-minded about trying a new medication regimen as he hopes it will help him feel more stable. He stated that he looks forward to continue his "mental health process" with his outpatient psychiatrist, and he was glad to hear that an appointment has been scheduled with Trena Alexander for next week. The patient stated that he would like to be discharged tomorrow, as he is ready to get back to work. He mentioned that after returning to work for one week (due to his boss taking vacation next week), he plans to take leave in order to visit his fiance in [facility] and spend some dedicated time taking care of his mental health. The patient denied suicidal ideation or thoughts of self-harm, as he has throughout this admission.

## 2024-08-21 NOTE — BH INPATIENT PSYCHIATRY DISCHARGE NOTE - NSBHFUPINTERVALHXFT_PSY_A_CORE
Since the last progress note, there have been no significant behavioral episodes. The patient has been adherent to scheduled medications; he did not received scheduled prazosin, reportedly because it was not offered. The patient received PRN lorazepam 2 mg at 1643.     Upon evaluation today, the patient was encountered in the common area talking on the patient phone and amenable to meeting with treatment team members in his room after finishing the phone call. He was calm with bright affect, and he reported feeling "100 times better." The patient reported that he slept well, possibly attributable to receiving quetiapine for the first time last night. He expressed feeling open-minded about trying a new medication regimen as he hopes it will help him feel more stable. He stated that he looks forward to continue his "mental health process" with his outpatient psychiatrist, and he was glad to hear that an appointment has been scheduled with Trena Alexander for next week. The patient stated that he would like to be discharged tomorrow, as he is ready to get back to work. He mentioned that after returning to work for one week (due to his boss taking vacation next week), he plans to take leave in order to visit his fiance in [facility] and spend some dedicated time taking care of his mental health. The patient denied suicidal ideation or thoughts of self-harm, as he has throughout this admission.

## 2024-08-21 NOTE — BH INPATIENT PSYCHIATRY DISCHARGE NOTE - NSBHSUICIDESTATUS_PSY_ALL_CORE
Since arriving to the inpatient unit, the patient has denied suicidal thoughts. Chronic risk factors for suicide remain, but he also has many protective factors, including hope, good adherence to treatment, future goals, housing stability, employment stability, relationship stability, and supportive family.

## 2024-08-21 NOTE — BH INPATIENT PSYCHIATRY DISCHARGE NOTE - ATTENDING DISCHARGE PHYSICAL EXAMINATION:
;;08/22: Not endorsing  suicidal or homicidal ideation intent or plans; no mention of auditory or visual hallucinations Alert; oriented; cognition intact; speech clear; no tremor or evidence of movement impairment. i&j fair  : aware of medications and acknowledges symptoms but slightly  reflective in a meaningful way  on issues that impact on symptoms. Future oriented; looks forward to getting a haircut .  Also wants to go back to work.

## 2024-08-21 NOTE — BH INPATIENT PSYCHIATRY DISCHARGE NOTE - NSDCMRMEDTOKEN_GEN_ALL_CORE_FT
escitalopram 10 mg oral tablet: 1 tab(s) orally once a day  prazosin 1 mg oral capsule: 1 cap(s) orally once a day (at bedtime)  QUEtiapine 50 mg oral tablet: 1 tab(s) orally once a day (at bedtime)

## 2024-08-21 NOTE — BH INPATIENT PSYCHIATRY DISCHARGE NOTE - NSDCCPCAREPLAN_GEN_ALL_CORE_FT
PRINCIPAL DISCHARGE DIAGNOSIS  Diagnosis: Adjustment disorder with anxious mood  Assessment and Plan of Treatment:       SECONDARY DISCHARGE DIAGNOSES  Diagnosis: Substance induced mood disorder  Assessment and Plan of Treatment:      PRINCIPAL DISCHARGE DIAGNOSIS  Diagnosis: Adjustment disorder with anxious mood  Assessment and Plan of Treatment:       SECONDARY DISCHARGE DIAGNOSES  Diagnosis: Substance induced mood disorder  Assessment and Plan of Treatment:     Diagnosis: Post traumatic stress disorder (PTSD)  Assessment and Plan of Treatment:
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
## 2024-08-21 NOTE — BH INPATIENT PSYCHIATRY DISCHARGE NOTE - DESCRIPTION
The patient lives with his family. He is engaged to a man who lives in [facility] and is in the process of petitioning for his fiancé to come to the [place]; this process has been ongoing for the past 14 months. 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 He finished 10th grade and has not competed his GED. Regarding substance use, the patient smokes 4-5 cigarettes daily when at work. He reported that he has been struggling with alcohol for the past 5-6 months, during which time he has been drinking 10-15 beers 3 times weekly.

## 2024-08-21 NOTE — BH INPATIENT PSYCHIATRY PROGRESS NOTE - CURRENT MEDICATION
MEDICATIONS  (STANDING):  escitalopram 10 milliGRAM(s) Oral daily  multivitamin 1 Tablet(s) Oral daily  prazosin. 1 milliGRAM(s) Oral at bedtime  QUEtiapine 50 milliGRAM(s) Oral at bedtime  thiamine 100 milliGRAM(s) Oral daily    MEDICATIONS  (PRN):  acetaminophen     Tablet .. 650 milliGRAM(s) Oral every 6 hours PRN Temp greater or equal to 38.5C (101.3F), Moderate Pain (4 - 6)  acetaminophen     Tablet .. 325 milliGRAM(s) Oral every 4 hours PRN Temp greater or equal to 38C (100.4F), Mild Pain (1 - 3)  aluminum hydroxide/magnesium hydroxide/simethicone Suspension 30 milliLiter(s) Oral every 4 hours PRN Dyspepsia  haloperidol     Tablet 5 milliGRAM(s) Oral every 6 hours PRN Agitation  LORazepam     Tablet 2 milliGRAM(s) Oral every 6 hours PRN EtOH withdrawal  melatonin. 3 milliGRAM(s) Oral at bedtime PRN Insomnia  nicotine  Polacrilex Gum 2 milliGRAM(s) Oral every 6 hours PRN Smoking Cessation   MEDICATIONS  (STANDING):  escitalopram 10 milliGRAM(s) Oral daily  multivitamin 1 Tablet(s) Oral daily  prazosin. 1 milliGRAM(s) Oral at bedtime  QUEtiapine 50 milliGRAM(s) Oral at bedtime  thiamine 100 milliGRAM(s) Oral daily    MEDICATIONS  (PRN):  acetaminophen     Tablet .. 325 milliGRAM(s) Oral every 4 hours PRN Temp greater or equal to 38C (100.4F), Mild Pain (1 - 3)  acetaminophen     Tablet .. 650 milliGRAM(s) Oral every 6 hours PRN Temp greater or equal to 38.5C (101.3F), Moderate Pain (4 - 6)  aluminum hydroxide/magnesium hydroxide/simethicone Suspension 30 milliLiter(s) Oral every 4 hours PRN Dyspepsia  haloperidol     Tablet 5 milliGRAM(s) Oral every 6 hours PRN Agitation  LORazepam     Tablet 2 milliGRAM(s) Oral every 6 hours PRN EtOH withdrawal  melatonin. 3 milliGRAM(s) Oral at bedtime PRN Insomnia  nicotine  Polacrilex Gum 2 milliGRAM(s) Oral every 6 hours PRN Smoking Cessation

## 2024-08-21 NOTE — BH INPATIENT PSYCHIATRY DISCHARGE NOTE - NSDCRECOMMENDMEDICALFT_PSY_ALL_CORE
Please continue to follow the diet and exercise recommendations provided by your primary care physician. Also, please follow up with your primary care physician within one month of discharge to discuss your cholesterol labs, which were elevated during this hospitalization.

## 2024-08-21 NOTE — BH INPATIENT PSYCHIATRY PROGRESS NOTE - NSBHMETABOLIC_PSY_ALL_CORE_FT
BMI: BMI (kg/m2): 34 (08-19-24 @ 16:14)  HbA1c: A1C with Estimated Average Glucose Result: 5.3 % (08-21-24 @ 05:30)    Glucose:   BP: 137/81 (08-21-24 @ 08:45) (116/69 - 146/97)Vital Signs Last 24 Hrs  T(C): 36.7 (08-21-24 @ 08:45), Max: 37.1 (08-20-24 @ 16:54)  T(F): 98 (08-21-24 @ 08:45), Max: 98.8 (08-20-24 @ 16:54)  HR: 88 (08-21-24 @ 08:45) (88 - 111)  BP: 137/81 (08-21-24 @ 08:45) (134/89 - 137/81)  BP(mean): --  RR: --  SpO2: 97% (08-21-24 @ 08:45) (96% - 97%)      Lipid Panel: Date/Time: 08-21-24 @ 05:30  Cholesterol, Serum: 231  LDL Cholesterol Calculated: 143  HDL Cholesterol, Serum: 56  Total Cholesterol/HDL Ration Measurement: --  Triglycerides, Serum: 181   BMI: BMI (kg/m2): 34 (08-19-24 @ 16:14)  HbA1c: A1C with Estimated Average Glucose Result: 5.3 % (08-21-24 @ 05:30)    Glucose:   BP: 134/88 (08-21-24 @ 16:42) (116/69 - 146/97)Vital Signs Last 24 Hrs  T(C): 36.9 (08-21-24 @ 16:42), Max: 36.9 (08-21-24 @ 16:42)  T(F): 98.5 (08-21-24 @ 16:42), Max: 98.5 (08-21-24 @ 16:42)  HR: 77 (08-21-24 @ 16:42) (77 - 88)  BP: 134/88 (08-21-24 @ 16:42) (134/88 - 137/81)  BP(mean): --  RR: --  SpO2: 98% (08-21-24 @ 16:42) (97% - 98%)      Lipid Panel: Date/Time: 08-21-24 @ 05:30  Cholesterol, Serum: 231  LDL Cholesterol Calculated: 143  HDL Cholesterol, Serum: 56  Total Cholesterol/HDL Ration Measurement: --  Triglycerides, Serum: 181
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
## 2024-08-21 NOTE — BH INPATIENT PSYCHIATRY DISCHARGE NOTE - NSBHMETABOLIC_PSY_ALL_CORE_FT
BMI: BMI (kg/m2): 34 (08-19-24 @ 16:14)  HbA1c: A1C with Estimated Average Glucose Result: 5.3 % (08-21-24 @ 05:30)    Glucose:   BP: 134/88 (08-21-24 @ 16:42) (116/69 - 146/97)Vital Signs Last 24 Hrs  T(C): 36.9 (08-21-24 @ 16:42), Max: 36.9 (08-21-24 @ 16:42)  T(F): 98.5 (08-21-24 @ 16:42), Max: 98.5 (08-21-24 @ 16:42)  HR: 77 (08-21-24 @ 16:42) (77 - 88)  BP: 134/88 (08-21-24 @ 16:42) (134/88 - 137/81)  BP(mean): --  RR: --  SpO2: 98% (08-21-24 @ 16:42) (97% - 98%)      Lipid Panel: Date/Time: 08-21-24 @ 05:30  Cholesterol, Serum: 231  LDL Cholesterol Calculated: 143  HDL Cholesterol, Serum: 56  Total Cholesterol/HDL Ration Measurement: --  Triglycerides, Serum: 181

## 2024-08-21 NOTE — BH INPATIENT PSYCHIATRY DISCHARGE NOTE - REASON FOR ADMISSION
The patient is a 43-year-old man with a history of post-traumatic stress disorder due to significant childhood trauma who was brought to the [facility] ED by ambulance after his sister called 911 when she saw the patient with an electronic cable wrapped around his neck. The patient was then transferred to [facility] for admission to the inpatient psychiatry unit [facility] for stabilization of his recent worsening anxiety symptoms in the context of various life stressors.

## 2024-08-21 NOTE — BH INPATIENT PSYCHIATRY PROGRESS NOTE - PRN MEDS
MEDICATIONS  (PRN):  acetaminophen     Tablet .. 650 milliGRAM(s) Oral every 6 hours PRN Temp greater or equal to 38.5C (101.3F), Moderate Pain (4 - 6)  acetaminophen     Tablet .. 325 milliGRAM(s) Oral every 4 hours PRN Temp greater or equal to 38C (100.4F), Mild Pain (1 - 3)  aluminum hydroxide/magnesium hydroxide/simethicone Suspension 30 milliLiter(s) Oral every 4 hours PRN Dyspepsia  haloperidol     Tablet 5 milliGRAM(s) Oral every 6 hours PRN Agitation  LORazepam     Tablet 2 milliGRAM(s) Oral every 6 hours PRN EtOH withdrawal  melatonin. 3 milliGRAM(s) Oral at bedtime PRN Insomnia  nicotine  Polacrilex Gum 2 milliGRAM(s) Oral every 6 hours PRN Smoking Cessation   MEDICATIONS  (PRN):  acetaminophen     Tablet .. 325 milliGRAM(s) Oral every 4 hours PRN Temp greater or equal to 38C (100.4F), Mild Pain (1 - 3)  acetaminophen     Tablet .. 650 milliGRAM(s) Oral every 6 hours PRN Temp greater or equal to 38.5C (101.3F), Moderate Pain (4 - 6)  aluminum hydroxide/magnesium hydroxide/simethicone Suspension 30 milliLiter(s) Oral every 4 hours PRN Dyspepsia  haloperidol     Tablet 5 milliGRAM(s) Oral every 6 hours PRN Agitation  LORazepam     Tablet 2 milliGRAM(s) Oral every 6 hours PRN EtOH withdrawal  melatonin. 3 milliGRAM(s) Oral at bedtime PRN Insomnia  nicotine  Polacrilex Gum 2 milliGRAM(s) Oral every 6 hours PRN Smoking Cessation

## 2024-08-21 NOTE — BH INPATIENT PSYCHIATRY PROGRESS NOTE - NSBHCHARTREVIEWVS_PSY_A_CORE FT
Vital Signs Last 24 Hrs  T(C): 36.7 (08-21-24 @ 08:45), Max: 37.1 (08-20-24 @ 16:54)  T(F): 98 (08-21-24 @ 08:45), Max: 98.8 (08-20-24 @ 16:54)  HR: 88 (08-21-24 @ 08:45) (88 - 111)  BP: 137/81 (08-21-24 @ 08:45) (134/89 - 137/81)  BP(mean): --  RR: --  SpO2: 97% (08-21-24 @ 08:45) (96% - 97%)     Vital Signs Last 24 Hrs  T(C): 36.9 (08-21-24 @ 16:42), Max: 36.9 (08-21-24 @ 16:42)  T(F): 98.5 (08-21-24 @ 16:42), Max: 98.5 (08-21-24 @ 16:42)  HR: 77 (08-21-24 @ 16:42) (77 - 88)  BP: 134/88 (08-21-24 @ 16:42) (134/88 - 137/81)  BP(mean): --  RR: --  SpO2: 98% (08-21-24 @ 16:42) (97% - 98%)

## 2024-08-21 NOTE — BH INPATIENT PSYCHIATRY PROGRESS NOTE - NSBHATTESTCOMMENTATTENDFT_PSY_A_CORE
;;08/21:   feels ready to leave; Not endorsing  suicidal or homicidal ideation intent or plans; no mention of auditory or visual hallucinations ; neat clean; good eye contact; wants to get back to work as soon as possible .  plan for dc in am.     Tablet .. 325 milliGRAM(s) Oral every 4 hours PRN  acetaminophen     Tablet .. 650 milliGRAM(s) Oral every 6 hours PRN  aluminum hydroxide/magnesium hydroxide/simethicone Suspension 30 milliLiter(s) Oral every 4 hours PRN  escitalopram 10 milliGRAM(s) Oral daily for mood and ptsd  haloperidol     Tablet 5 milliGRAM(s) Oral every 6 hours PRN  LORazepam     Tablet 2 milliGRAM(s) Oral every 6 hours PRN  melatonin. 3 milliGRAM(s) Oral at bedtime PRN  multivitamin 1 Tablet(s) Oral daily  nicotine  Polacrilex Gum 2 milliGRAM(s) Oral every 6 hours PRN  prazosin. 1 milliGRAM(s) Oral at bedtime for nightmares  QUEtiapine 50 milliGRAM(s) Oral at bedtime for augmentation for mood and also for insomnia  thiamine 100 milliGRAM(s) Oral daily

## 2024-08-21 NOTE — BH INPATIENT PSYCHIATRY DISCHARGE NOTE - NSBHDCMEDICALFT_PSY_A_CORE
Upon admission, the patient reported that he was diagnosed with fatty liver 8 months prior and that this is currently managed conservatively (with diet and exercise changes). He denied having any other chronic medical problems or taking any regular medications. No acute medical problems arose during the hospitalization. The patient was maintained on CIWA protocol after his arrival to monitor for signs/symptoms of alcohol withdrawal, but his CIWA score remained low such that he did not require benzodiazepines for withdrawal management. Routine metabolic labs were obtained prior to starting the patient on an antipsychotic medication. These revealed a normal A1c of 5.3 and elevated cholesterol (231), triglycerides (181), non-HDL cholesterol (175), and LDL cholesterol (143). The patient was encouraged to follow up with his primary care physician for further monitoring and management, if necessary, of hyperlipidemia.    The treatment team discussed naltrexone for alcohol use disorder with the patient, but it was not initiated due to the patient's belief that he does not struggle with craving alcohol but rather drinks because alcohol is often abundant in his environment. Also, given naltrexone's potential to cause hepatocellular injury, the medication may be more harmful than helpful in this patient with fatty liver disease and mild elevations in liver enzymes (AST 45, ALT 97).

## 2024-08-21 NOTE — BH INPATIENT PSYCHIATRY DISCHARGE NOTE - NSBHASSESSSUMMFT_PSY_ALL_CORE
is a 43-year-old man, domiciled with sister, engaged to partner who lives in [facility], non-caregiver, employed, with no significant PMH, PPH of PTSD, one prior remote psychiatric hospitalization at age 14, three prior suicide attempts per chart, no known history of violence or arrests, no known history of complicated alcohol withdrawal, who was admitted to [facility] as a transfer from [facility] where he presented on 8/19/24 BIBA activated by his sister after she encountered the patient with an HDMI cord wrapped around his neck while intoxicated in the company of his cousin.    Upon initial evaluation, the patient reported poor sleep, anxiety, paranoia (on the days following alcohol intoxication), panic attacks, nightmares, intermittent restlessness, and possible recent suicidal ideation without a plan after receiving bad news from his . Mental status exam was notable for anxious affect. The patient’s presentation and recent history suggest that his PTSD symptoms have been exacerbated by psychosocial stressors and alcohol use/withdrawal. It seems like he has been using alcohol and benzodiazepines to reduce high levels of anxiety, and in turn, he experiences worsening anxiety due to withdrawal, which worsens his PTSD symptoms. He was psychiatrically hospitalized for symptomatic stabilization, which clinicians believed would benefit from abstinence from alcohol in combination with modest medication changes.    The patient was started on escitalopram 10 mg daily, quetiapine 50 mg nightly, and prazosin 1 mg nightly on 8/20/24. By 8/21/24, he already appeared brighter and reported feeling "100 times better," attributable in part to getting a good night sleep likely due to quetiapine. The patient denied suicidal ideation or thoughts of self-harm, as he had on the initial inpatient evaluation. He presented as euthymic, hopeful, future-oriented, and ready to continue working with his outpatient psychiatrist on further medication adjustments.
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
## 2024-08-22 VITALS
DIASTOLIC BLOOD PRESSURE: 88 MMHG | SYSTOLIC BLOOD PRESSURE: 138 MMHG | HEART RATE: 96 BPM | OXYGEN SATURATION: 98 % | TEMPERATURE: 98 F

## 2024-08-22 PROCEDURE — 99238 HOSP IP/OBS DSCHRG MGMT 30/<: CPT

## 2024-08-22 PROCEDURE — 83036 HEMOGLOBIN GLYCOSYLATED A1C: CPT

## 2024-08-22 PROCEDURE — 82607 VITAMIN B-12: CPT

## 2024-08-22 PROCEDURE — 36415 COLL VENOUS BLD VENIPUNCTURE: CPT

## 2024-08-22 PROCEDURE — 80061 LIPID PANEL: CPT

## 2024-08-22 PROCEDURE — 84443 ASSAY THYROID STIM HORMONE: CPT

## 2024-08-22 RX ADMIN — Medication 100 MILLIGRAM(S): at 09:55

## 2024-08-22 RX ADMIN — Medication 1 TABLET(S): at 09:55

## 2024-08-22 RX ADMIN — ESCITALOPRAM OXALATE 10 MILLIGRAM(S): 10 TABLET ORAL at 09:55

## 2024-08-22 NOTE — BH DISCHARGE NOTE NURSING/SOCIAL WORK/PSYCH REHAB - PATIENT PORTAL LINK FT
You can access the FollowMyHealth Patient Portal offered by [facility] by registering at the following website: [email or web address]. By joining [facility]’s FollowMyHealth portal, you will also be able to view your health information using other applications (apps) compatible with our system.
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
## 2024-08-22 NOTE — BH DISCHARGE NOTE NURSING/SOCIAL WORK/PSYCH REHAB - NSCDUDCCRISIS_PSY_A_CORE
[facility] Well  [phone] [facility]-WELL ([phone])  Text "WELL" to 90324  Website: www.[email or web address]  [facility] - [facility]ASH – Crisis Care for Children, Adults and Families  [address] [place] [address]  Mobile Crisis Hotline – (565) 773-6853/.  [facility] Response Crisis Hotline  (179) 628-2920  24 hour telephone crisis intervention and suicide prevention hotline concerned with all mental health issues/988 Suicide and Crisis Lifeline

## 2024-08-22 NOTE — BH DISCHARGE NOTE NURSING/SOCIAL WORK/PSYCH REHAB - NSDCADDINFO1FT_PSY_ALL_CORE
Psychiatry appointment scheduled with Trena Alexander NP for Thursday, August 29th at 4pm. This is an IN PERSON appointment.

## 2024-08-22 NOTE — BH DISCHARGE NOTE NURSING/SOCIAL WORK/PSYCH REHAB - NSTOBACCOOTHER_PSY_ALL_CORE_FT
Call [facility] Smokers' Quitline at 0-526-[place] QU[place] (1-486.385.2279) or visit [email or web address]

## 2024-08-23 NOTE — BH SOCIAL WORK CONFIRMATION FOLLOW UP NOTE - NSCOMMENTS_PSY_ALL_CORE
Caring Call 8/23/24: SW spoke with pt via phone who stated he is doing well. "I hit the gym today and now I'm having a smoothie." Pt denied SI/HI/AVH. Pt reminded of upcoming appointment on 8/29.    Linkage Call 8/29/24:     Aftercare Appointment  [facility]  29-Aug-2024 16:00  [address] [place] [address]  [phone]      Caring Call 8/23/24: SW spoke with pt via phone who stated he is doing well. "I hit the gym today and now I'm having a smoothie." Pt denied SI/HI/AVH. Pt reminded of upcoming appointment on 8/29.    Linkage Call 8/29/24: PAULA spoke with staff at [facility] who confirmed pt attended their appointment.

## 2024-09-06 DIAGNOSIS — F43.22 ADJUSTMENT DISORDER WITH ANXIETY: ICD-10-CM

## 2024-09-06 DIAGNOSIS — Z91.51 PERSONAL HISTORY OF SUICIDAL BEHAVIOR: ICD-10-CM

## 2024-09-06 DIAGNOSIS — E78.5 HYPERLIPIDEMIA, UNSPECIFIED: ICD-10-CM

## 2024-09-06 DIAGNOSIS — K76.0 FATTY (CHANGE OF) LIVER, NOT ELSEWHERE CLASSIFIED: ICD-10-CM

## 2024-09-06 DIAGNOSIS — F17.210 NICOTINE DEPENDENCE, CIGARETTES, UNCOMPLICATED: ICD-10-CM

## 2024-09-06 DIAGNOSIS — F19.94 OTHER PSYCHOACTIVE SUBSTANCE USE, UNSPECIFIED WITH PSYCHOACTIVE SUBSTANCE-INDUCED MOOD DISORDER: ICD-10-CM

## 2024-09-06 DIAGNOSIS — Z62.810 PERSONAL HISTORY OF PHYSICAL AND SEXUAL ABUSE IN CHILDHOOD: ICD-10-CM

## 2024-09-06 DIAGNOSIS — F10.20 ALCOHOL DEPENDENCE, UNCOMPLICATED: ICD-10-CM

## 2024-09-06 DIAGNOSIS — F43.10 POST-TRAUMATIC STRESS DISORDER, UNSPECIFIED: ICD-10-CM

## 2025-02-17 NOTE — ED ADULT TRIAGE NOTE - NSTRIAGECARE_GEN_A_ER
CHIEF COMPLAINT: Office Visit (Room 15) and Medication Management      HISTORY OF PRESENT ILLNESS: Sarbjit Nguyen is a 51 year old male who presents for the following issues:     The patient is a 51-year-old male who presents for a medication check.    He has been adhering to his insulin regimen, with the exception of a few instances. However, he acknowledges a decline in his dietary habits. He possesses a Dexcom device at home, which he has not been utilizing since his vacation approximately a year ago. He expresses a preference for the FreeStyle Rosa Elena system, citing its smaller size and superior application. His insurance will approve it as long as he is on insulin. He does not require an insulin refill at this time.    He continues his amlodipine and benazepril regimen without experiencing any adverse effects such as dizziness. He reports no chest pain, irregular heart rhythms, or respiratory difficulties. He has a blood pressure monitor at home for regular monitoring.        Patient Active Problem List    Diagnosis Date Noted    Essential hypertension 06/26/2023     Priority: Low    Type 2 diabetes mellitus without complication, without long-term current use of insulin  (CMD) 05/24/2023     Priority: Low     Past Surgical History:   Procedure Laterality Date    Remove tonsils/adenoids,<13 y/o          Family History   Problem Relation Age of Onset    Hypertension Mother     Rheumatoid Arthritis Mother     Hypertension Father     Heart disease Father 35        CABG    Myocardial Infarction Father     Diabetes Sister         type 1    Heart disease Brother 45        CABG    Myocardial Infarction Brother        Social History     Socioeconomic History    Marital status: Significant other     Spouse name: Not on file    Number of children: Not on file    Years of education: Not on file    Highest education level: Not on file   Occupational History    Not on file   Tobacco Use    Smoking status: Never    Smokeless  tobacco: Never   Vaping Use    Vaping status: never used   Substance and Sexual Activity    Alcohol use: Yes     Alcohol/week: 2.0 - 3.0 standard drinks of alcohol     Types: 2 - 3 Standard drinks or equivalent per week    Drug use: Never    Sexual activity: Yes     Partners: Female     Birth control/protection: None   Other Topics Concern     Service Not Asked    Blood Transfusions Not Asked    Caffeine Concern Yes     Comment: 2 cans diet soda / day, occasional energy drink    Occupational Exposure Not Asked    Hobby Hazards Not Asked    Sleep Concern Not Asked    Stress Concern Not Asked    Weight Concern Not Asked    Special Diet Not Asked    Back Care Not Asked    Exercise Not Asked    Bike Helmet Not Asked    Seat Belt Not Asked    Self-Exams Not Asked   Social History Narrative    ** Merged History Encounter **          Social Determinants of Health     Financial Resource Strain: Not on file   Food Insecurity: Low Risk  (11/13/2024)    Food Insecurity     Worried about Food: Never true     Food is Gone: Never true   Transportation Needs: Not At Risk (11/13/2024)    Transportation Needs     Lack of Reliable Transportation: No   Physical Activity: Not on file   Stress: Not on file   Social Connections: Not on file   Interpersonal Safety: Not on file      Current Outpatient Medications   Medication Sig Dispense Refill    Continuous Glucose Sensor (FreeStyle Rosa Elena 3 Sensor) Misc Apply every 14 days to monitor blood sugar 6 each 2    Insulin Glargine-yfgn (Semglee, yfgn,) 100 UNIT/ML Solution Pen-injector Inject 34 Units into the skin nightly. Prime 2 units before each dose.      amlodipine-benazepril (LOTREL) 5-20 MG per capsule TAKE 1 CAPSULE DAILY 90 capsule 1    atorvastatin (LIPITOR) 10 MG tablet Take 1 tablet by mouth daily. 90 tablet 3    blood glucose test strip Test blood sugar 4 times daily. Diagnosis: Type 2 diabetes. Meter: as covered by insurance 420 strip 3    Insulin Pen Needle (Advocate  Insulin Pen Needles) 33G X 4 MM Misc Use to inject insulin one times daily. Remove needle cover(s) to expose needle before injecting. 100 each PRN    Continuous Glucose  (Dexcom G6 ) Device CONTINUOUS BLOOD GLUCOSE   READER 1 each 0    Continuous Blood Gluc Transmit (Dexcom G6 Transmitter) Misc Every 3 months. 1 each 2    gabapentin (NEURONTIN) 300 MG capsule Take 1 capsule by mouth in the morning and 1 capsule at noon and 1 capsule in the evening. 90 capsule 0    naproxen (NAPROSYN) 500 MG tablet TAKE 1 TABLET BY MOUTH 2 TIMES A DAY (MORNING AND EVENING) WITH MEALS. INDICATIONS: JOINT DAMAGE CAUSING PAIN AND LOSS OF FUNCTION 60 tablet 0    cyclobenzaprine (FLEXERIL) 5 MG tablet Take 1-2 tablets by mouth 3 times daily as needed for Muscle spasms. (Patient not taking: Reported on 2/17/2025) 30 tablet 0    blood glucose meter Test blood sugar 1 times daily. Diagnosis: Type 2 diabetes. Meter: as covered by insurance 1 kit 0    blood glucose lancets Test blood sugar 1 times daily. Diagnosis: Type 2 diabetes. Meter: as covered by insurance 100 each prn    tadalafil (CIALIS) 20 MG tablet Take 1 tablet by mouth as needed for Erectile Dysfunction. 10 tablet 0     No current facility-administered medications for this visit.        ALLERGIES:   Allergen Reactions    Metformin RASH       Most Recent Immunizations   Administered Date(s) Administered    COVID Pia/Fernandez & Fernandez 18Y+ 07/27/2021    COVID Moderna/Spikevax 12+ 06/13/2024    Hep B, Adult, 2 Dose 06/13/2024    Pneumococcal conjugate PCV20 06/13/2024    Tdap 06/13/2024     REVIEW OF SYSTEMS:  CARDIOVASCULAR:  Denies chest pain, shortness of breath, palpitations.  NEUROLOGIC:  Denies dizzy spells.    Objective:  VITALS:  Visit Vitals  /82 (BP Location: RUE - Right upper extremity, Patient Position: Sitting, Cuff Size: Large Adult)   Pulse 80   Resp 18   Ht 5' 7\" (1.702 m)   Wt 99.8 kg (220 lb)   SpO2 96%   BMI 34.46 kg/m²     PHYSICAL  EXAMINATION:  General:  conversant and in no acute distress  HEENT:   normocephalic and atraumatic  Cardiovascular: Regular rate and rhythm, no murmurs, rubs or gallops, S1 and S2 normal, and no S3 or S4  Lungs:clear to auscultation bilaterally, good air entry, no rales or wheezes, and no rhonchi  Musculoskeletal: no edema in bilateral lower extremities and no digital cyanosis or clubbing  Neurologic:   speech fluent and cranial nerves intact  Skin:   color and texture are normal and no bruises, rashes or lesions   Psychiatric: Mood: Appropriate; Alert and oriented x3    ASSESSMENT AND PLAN:    Type 2 diabetes mellitus without complication, without long-term current use of insulin  (CMD)  His blood glucose levels have been consistently elevated, necessitating an increase in his insulin dosage. He is advised to use the FreeStyle Rosa Elena system for continuous glucose monitoring, as it is preferred over the Dexcom by the patient and is now covered by his new insurance. The insulin dosage will be increased to 34 units nightly. A prescription for the FreeStyle Rosa Elena system, sufficient for a 90-day supply, will be provided through [facility]. He is encouraged to monitor his blood glucose levels closely and report any significant changes. If his blood glucose levels remain above 200 for several days, the insulin dosage may be increased by 4 units. [facility]  - Continuous Glucose Sensor (FreeStyle Rosa Elena 3 Sensor) Misc; Apply every 14 days to monitor blood sugar  Dispense: 6 each; Refill: 2  - Insulin Glargine-yfgn (Semglee, yfpam,) 100 UNIT/ML Solution Pen-injector; Inject 34 Units into the skin nightly. Prime 2 units before each dose.    Essential hypertension  His blood pressure was slightly elevated during this visit. He is advised to monitor his blood pressure at home and report the readings over the next few weeks. If the readings remain high, adjustments to  his medication may be considered.    Health maintenance.  He is due for colon cancer screening. He is advised to complete a fecal occult blood test. A kit will be provided, and instructions on how to collect the sample were given.    Follow-up  The patient will follow up in mid-May 2025.    FOLLOW UP:  Return in about 3 months (around 5/17/2025) for annual health checkup.   Face Mask